# Patient Record
Sex: FEMALE | Race: WHITE | Employment: OTHER | URBAN - METROPOLITAN AREA
[De-identification: names, ages, dates, MRNs, and addresses within clinical notes are randomized per-mention and may not be internally consistent; named-entity substitution may affect disease eponyms.]

---

## 2017-10-11 ENCOUNTER — PRE VISIT (OUTPATIENT)
Dept: SURGERY | Facility: CLINIC | Age: 57
End: 2017-10-11

## 2017-10-11 NOTE — TELEPHONE ENCOUNTER
APPT INFORMATION    Date & Time:  10/18/17 at 10:30AM   Reason for Appt:  NBS   Referring Name/Clinic:  Letitia Vu [facility]    Yes / No COMMENT / NOTES DATE & ACTION   Patient Contacted?  No  Pt is referred                    RECORDS CLINIC NAME  (use N/A if no records ) DATE & ACTION RECEIVED RECS & IMG? Y/N   (may include other helpful notes)   External Clinics:  [facility] ([place]) 10/11/17 Faxed records request  yes               Internal Clinics:  n/a

## 2017-10-16 ENCOUNTER — CARE COORDINATION (OUTPATIENT)
Dept: SURGERY | Facility: CLINIC | Age: 57
End: 2017-10-16

## 2017-10-16 NOTE — PROGRESS NOTES
Called patient and left her number to call in regards to going over new patient questions. Also will send my chart message.

## 2017-10-16 NOTE — TELEPHONE ENCOUNTER
Records Received From:  [facility]     Date/Exam/Location  (specify location if different)   Office Notes:  4/12/17, 4/4/17, 3/15/17   Radiology Reports:  bone density 4/18/17

## 2017-10-16 NOTE — TELEPHONE ENCOUNTER
Records Received From:  [facility]     Date/Exam/Location  (specify location if different)   Office Notes:  9/29/17, 2/13/17, 2/16/17   Labs:  2017

## 2017-10-18 ENCOUNTER — ALLIED HEALTH/NURSE VISIT (OUTPATIENT)
Dept: SURGERY | Facility: CLINIC | Age: 57
End: 2017-10-18

## 2017-10-18 ENCOUNTER — OFFICE VISIT (OUTPATIENT)
Dept: SURGERY | Facility: CLINIC | Age: 57
End: 2017-10-18

## 2017-10-18 VITALS
DIASTOLIC BLOOD PRESSURE: 75 MMHG | WEIGHT: 255.4 LBS | SYSTOLIC BLOOD PRESSURE: 127 MMHG | OXYGEN SATURATION: 92 % | TEMPERATURE: 97.8 F | BODY MASS INDEX: 43.6 KG/M2 | HEART RATE: 74 BPM | HEIGHT: 64 IN

## 2017-10-18 DIAGNOSIS — E66.01 MORBID OBESITY (H): Primary | ICD-10-CM

## 2017-10-18 DIAGNOSIS — E66.01 MORBID OBESITY (H): ICD-10-CM

## 2017-10-18 LAB
ALBUMIN SERPL-MCNC: 3.8 G/DL (ref 3.4–5)
ALP SERPL-CCNC: 88 U/L (ref 40–150)
ALT SERPL W P-5'-P-CCNC: 28 U/L (ref 0–50)
ANION GAP SERPL CALCULATED.3IONS-SCNC: 10 MMOL/L (ref 3–14)
AST SERPL W P-5'-P-CCNC: 22 U/L (ref 0–45)
BILIRUB SERPL-MCNC: 0.3 MG/DL (ref 0.2–1.3)
BUN SERPL-MCNC: 20 MG/DL (ref 7–30)
CALCIUM SERPL-MCNC: 8.9 MG/DL (ref 8.5–10.1)
CHLORIDE SERPL-SCNC: 106 MMOL/L (ref 94–109)
CO2 SERPL-SCNC: 22 MMOL/L (ref 20–32)
CREAT SERPL-MCNC: 0.85 MG/DL (ref 0.52–1.04)
DEPRECATED CALCIDIOL+CALCIFEROL SERPL-MC: 57 UG/L (ref 20–75)
ERYTHROCYTE [DISTWIDTH] IN BLOOD BY AUTOMATED COUNT: 13.5 % (ref 10–15)
GFR SERPL CREATININE-BSD FRML MDRD: 69 ML/MIN/1.7M2
GLUCOSE SERPL-MCNC: 93 MG/DL (ref 70–99)
HBA1C MFR BLD: 6 % (ref 4.3–6)
HCT VFR BLD AUTO: 39.8 % (ref 35–47)
HGB BLD-MCNC: 12.9 G/DL (ref 11.7–15.7)
MCH RBC QN AUTO: 28.2 PG (ref 26.5–33)
MCHC RBC AUTO-ENTMCNC: 32.4 G/DL (ref 31.5–36.5)
MCV RBC AUTO: 87 FL (ref 78–100)
PLATELET # BLD AUTO: 300 10E9/L (ref 150–450)
POTASSIUM SERPL-SCNC: 4 MMOL/L (ref 3.4–5.3)
PROT SERPL-MCNC: 7.5 G/DL (ref 6.8–8.8)
PTH-INTACT SERPL-MCNC: 34 PG/ML (ref 12–72)
RBC # BLD AUTO: 4.57 10E12/L (ref 3.8–5.2)
SODIUM SERPL-SCNC: 138 MMOL/L (ref 133–144)
WBC # BLD AUTO: 7.3 10E9/L (ref 4–11)

## 2017-10-18 RX ORDER — TOPIRAMATE 25 MG/1
75 TABLET, FILM COATED ORAL 2 TIMES DAILY
Qty: 180 TABLET | Refills: 3 | Status: SHIPPED | OUTPATIENT
Start: 2017-10-18 | End: 2019-06-06

## 2017-10-18 ASSESSMENT — PAIN SCALES - GENERAL: PAINLEVEL: MODERATE PAIN (5)
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
## 2017-10-18 NOTE — MR AVS SNAPSHOT
"              After Visit Summary   10/18/2017    Dara Anderson    MRN: [id number]           Patient Information     Date Of Birth          1960        Visit Information        Provider Department      10/18/2017 10:30 AM Alyssa Pink PA-C [facility]        Today's Diagnoses     Morbid obesity (H)    -  1      Care Instructions    See dietitian in 1 month and monthly for 6 months  Labs today first floor    Bariatric Task List  Status:  Is patient a candidate for bariatric surgery?:    - possible   Status:  surgery evaluation in process -     Surgeon: Dr Puente -     Tentative surgery month/year: April 2018 -        Insurance: Insurance:  [employer] -        Patient Info: Initial Weight:  255.4 lbs -     Date of Initial Weight/Height:  10/18/2017 -     Goal Weight (lbs):  245 -     Required Weight Loss:  10 -     Surgery Type:  sleeve gastrectomy -        Dietician Visits: Structured weight loss required by insurance?:  Yes -     Number of Visits:  6 -     Dietician Visit 1:  Completed -     Dietician Visit 2:  Needed -     Dietician Visit 3:  Needed -     Dietician Visit 4:  Needed -     Dietician Visit 5:  Needed -     Dietician Visit 6:  Needed -        Psychological Evaluation: Psych eval:  Needed -        Lab Work: Complete Blood Count:  Needed -     Comprehensive Metabolic Panel:  Needed -     Vitamin D:  Needed -     Hgb A1c:  Needed -     PTH:  Needed -        Consults/ Clearance: Sleep Medicine:  Needed - LUCIANO, uses CPAP, need letter of clearance   Cardiac:  Needed -     Pain: Needed - letter from PCP addressing pain      PCP: Establish care with PCP:  Completed -     PCP letter of support:  Needed -        Patient Education:  Information Session:  Needed -     Given \"Making your decision\" handout?:  Yes -     Given support group information?:  Yes -     Support plan in place?:  Completed -     Research consents signed?:  Yes -        Final Tasks:  Before surgery " online class:  Needed -     Before surgery online class website link:  [email or web address]   After surgery online class:  Needed -     After surgery online class website link:  [email or web address]   Nurse visit for weigh-in and information:  Needed -     Pre-assessment clinic visit with anesthesia team for H&P:  Needed -     Final labs (Hgb, plt, T&S, UA):  Needed -        Notes:   -                   Follow-ups after your visit        Future tests that were ordered for you today     Open Future Orders        Priority Expected Expires Ordered    Hemoglobin A1c Routine  1/16/2018 10/18/2017            Who to contact     Please call your clinic at [phone] to:    Ask questions about your health    Make or cancel appointments    Discuss your medicines    Learn about your test results    Speak to your doctor   If you have compliments or concerns about an experience at your clinic, or if you wish to file a complaint, please contact [facility] Patient Relations at [phone] or email us at [email or web address]         Additional Information About Your Visit        Visual[employer] Information     [employer] gives you secure access to your electronic health record. If you see a primary care provider, you can also send messages to your care team and make appointments. If you have questions, please call your primary care clinic.  If you do not have a primary care provider, please call [phone] and they will assist you.      [employer] is an electronic gateway that provides easy, online access to your medical records. With [employer], you can request a clinic appointment, read your test results, renew a prescription or communicate with your care team.     To access your existing account, please contact your [facility] or call [phone] for assistance.        Care EveryWhere ID     This is your Care EveryWhere ID. This could be  "used by other organizations to access your [place] [facility] medical records  [id number]        Your Vitals Were     Pulse Temperature Height Pulse Oximetry BMI (Body Mass Index)       74 97.8  F (36.6  C) 5' 3.98\" 92% 43.87 kg/m2        Blood Pressure from Last 3 Encounters:   10/18/17 127/75   01/17/14 118/68   07/03/13 96/68    Weight from Last 3 Encounters:   10/18/17 255 lb 6.4 oz   07/03/13 228 lb 4.8 oz   02/25/13 231 lb              We Performed the Following     CBC with platelets     Comprehensive metabolic panel     Parathyroid Hormone Intact     Vitamin D Deficiency        Primary Care Provider Office Phone # Fax #    Letitia PETERSEN Quincyzack  [phone]       [facility] [place] [address]  [place] [address]        Equal Access to Services     [facility] Hospital[facility]HELLY : Hadii aad ku hadasho Soomaali, waaxda luqadaha, qaybta kaalmada adeegyada, candie rodriguezin hayaan mick liang . So [facility] [phone].    ATENCIÓN: Si habla español, tiene a coronel disposición servicios gratuitos de asistencia lingüística. Llame al [phone].    We comply with applicable federal civil rights laws and [place] laws. We do not discriminate on the basis of race, color, national origin, age, disability, sex, sexual orientation, or gender identity.            Thank you!     Thank you for choosing [facility]  for your care. Our goal is always to provide you with excellent care. Hearing back from our patients is one way we can continue to improve our services. Please take a few minutes to complete the written survey that you may receive in the mail after your visit with us. Thank you!             Your Updated Medication List - Protect others around you: Learn how to safely use, store and throw away your medicines at www.disposemymeds.org.          This list is accurate as of: 10/18/17 11:07 AM.  Always use your most recent med list.                   Brand Name Dispense Instructions for use " Diagnosis    acetaminophen-codeine 300-30 MG ED starter pack    TYLENOL #3    60 tablet    Take 1 tablet by mouth every 4 hours as needed TAKE 1-2 TABLETS EVERY 4 HOURS IF NEEDED FOR PAIN    Follow-up examination, following other surgery       calcium & magnesium carbonates 311-232 MG Tabs           cyclobenzaprine 10 MG tablet    FLEXERIL    90 tablet    Take 1 tablet (10 mg) by mouth 3 times daily as needed for muscle spasms    Other symptoms referable to back       DULoxetine 60 MG EC capsule    CYMBALTA    180 capsule    Take 2 capsules by mouth daily.        FLUoxetine 40 MG capsule    PROzac    20 capsule    Take 1 capsule by mouth daily.    Depressive disorder, not elsewhere classified       ibuprofen 600 MG tablet    ADVIL/MOTRIN     Take 1 tablet by mouth every 8 hours as needed. FOR PAIN        MULTIVITAMIN PO      Take by mouth daily        nortriptyline 50 MG capsule    PAMELOR    90 capsule    Take 1 capsule by mouth At Bedtime.    Depressive disorder, not elsewhere classified       pravastatin 40 MG tablet    PRAVACHOL    90 tablet    Take 1 tablet (40 mg) by mouth daily    Other and unspecified hyperlipidemia       topiramate 50 MG tablet    TOPAMAX    180 tablet    Take 1 tablet (50 mg) by mouth 2 times daily    Depressive disorder, not elsewhere classified

## 2017-10-18 NOTE — PATIENT INSTRUCTIONS
"GOALS:  Relating To Eating:  Eat slowly (20-30 minutes per meal), chewing foods well (25 chews per bite/applesauce consistency)  9\" Plate method (1/2 plate non-starchy vegetables/fruit, 1/4 plate lean protein, 1/4 plate whole grain starch - no more than 1/2 cup carb/meal)  Record food intake prior to eating throughout the day in a notebook.     Relating to beverages:  Reduce caffeine/carbonation/calorie containing beverages (decrease diet Coke and increase water).  Separate fluids from meals by 30 minutes before, during, and after eating.     Relating to activity:  Increase activity as able (walk as able; pool; knee PT; request back PT if interested)    Relating to cravings:  Rid your immediate environment of the trigger foods.   "
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
## 2017-10-18 NOTE — PATIENT INSTRUCTIONS
"See dietitian in 1 month and monthly for 6 months  Labs today first floor  Watch bariatric seminar  Schedule with Dr Rosey Gould for bariatric psych eval today OR call [phone] to schedule with Dr Laine Guzmán.    Bariatric Task List  Status:  Is patient a candidate for bariatric surgery?:    - possible   Status:  surgery evaluation in process -     Surgeon: Dr Puente -     Tentative surgery month/year: April 2018 -        Insurance: Insurance:  [facility] -        Patient Info: Initial Weight:  255.4 lbs -     Date of Initial Weight/Height:  10/18/2017 -     Goal Weight (lbs):  245 -     Required Weight Loss:  10 -     Surgery Type:  sleeve gastrectomy -        Dietician Visits: Structured weight loss required by insurance?:  Yes -     Number of Visits:  6 -     Dietician Visit 1:  Completed -     Dietician Visit 2:  Needed -     Dietician Visit 3:  Needed -     Dietician Visit 4:  Needed -     Dietician Visit 5:  Needed -     Dietician Visit 6:  Needed -        Psychological Evaluation: Psych eval:  Needed -        Lab Work: Complete Blood Count:  Needed -     Comprehensive Metabolic Panel:  Needed -     Vitamin D:  Needed -     Hgb A1c:  Needed -     PTH:  Needed -        Consults/ Clearance: Sleep Medicine:  Needed - LUCIANO, uses CPAP, need letter of clearance   Cardiac:  Needed -     Pain: Needed - letter from PCP addressing pain      PCP: Establish care with PCP:  Completed -     PCP letter of support:  Needed -        Patient Education:  Information Session:  Needed -     Given \"Making your decision\" handout?:  Yes -     Given support group information?:  Yes -     Support plan in place?:  Completed -     Research consents signed?:  Yes -        Final Tasks:  Before surgery online class:  Needed -     Before surgery online class website link:  [email or web address]   After surgery online class:  Needed -     After surgery online class website link:  [email or web address]   Nurse " visit for weigh-in and information:  Needed -     Pre-assessment clinic visit with anesthesia team for H&P:  Needed -     Final labs (Hgb, plt, T&S, UA):  Needed -        Notes:   -

## 2017-10-18 NOTE — NURSING NOTE
"(   Chief Complaint   Patient presents with     Consult     NBS    )    ( Weight: 255 lb 6.4 oz )  ( Height: 5' 3.98\" )  ( BMI (Calculated): 43.96 )  ( Initial Weight: 255 lb 6.4 oz )  ( Cumulative weight loss (lbs): 0 )  (   )  (   )  ( Waist Circumference (cm): 132.5 cm )  (   )    ( BP: 127/75 )  (   )  ( Temp: 97.8  F (36.6  C) )  (   )  ( Pulse: 74 )  (   )  ( SpO2: 92 % )    (   Patient Active Problem List   Diagnosis     Degeneration of lumbar or lumbosacral intervertebral disc     Sprain of thoracic region     Myalgia and myositis     Nonspecific reaction to tuberculin skin test without active tuberculosis     Other symptoms referable to back     Hyperlipidemia     Premature menopause     Pseudogout     Adjustment disorder with mixed anxiety and depressed mood     Major depressive disorder, recurrent episode, moderate (H)     S/P total knee replacement     Advanced directives, counseling/discussion    )  (   Current Outpatient Prescriptions   Medication Sig Dispense Refill     pravastatin (PRAVACHOL) 40 MG tablet Take 1 tablet (40 mg) by mouth daily 90 tablet 3     topiramate (TOPAMAX) 50 MG tablet Take 1 tablet (50 mg) by mouth 2 times daily 180 tablet 0     acetaminophen-codeine (TYLENOL #3) 300-30 MG Take 1 tablet by mouth every 4 hours as needed TAKE 1-2 TABLETS EVERY 4 HOURS IF NEEDED FOR PAIN 60 tablet 0     Multiple Vitamins-Minerals (MULTIVITAMIN OR) Take by mouth daily       calcium & magnesium carbonates 311-232 MG TABS        cyclobenzaprine (FLEXERIL) 10 MG tablet Take 1 tablet (10 mg) by mouth 3 times daily as needed for muscle spasms 90 tablet 5     ibuprofen (ADVIL,MOTRIN) 600 MG tablet Take 1 tablet by mouth every 8 hours as needed. FOR PAIN       nortriptyline (PAMELOR) 50 MG capsule Take 1 capsule by mouth At Bedtime. 90 capsule 3     FLUoxetine (PROZAC) 40 MG capsule Take 1 capsule by mouth daily. 20 capsule 0     DULoxetine (CYMBALTA) 60 MG capsule Take 2 capsules by mouth daily. 180 " capsule 3    )  ( Diabetes Eval:    )    ( Pain Eval:  Moderate Pain (5) )    ( Wound Eval:       )    (   History   Smoking Status     Former Smoker     Quit date: 1/1/1980   Smokeless Tobacco     Never Used     Comment: Quit in the l980's-smoked about three years    )    ( Signed By:  Alma Kiran; October 18, 2017; 10:23 AM )

## 2017-10-18 NOTE — PROGRESS NOTES
"New Bariatric Nutrition Consultation Note    Reason For Visit: Nutrition Assessment    \"Isabella\" Dara Anderson is a 57 year-old presenting today for new bariatric nutrition consult.  Pt is interested in laparoscopic sleeve gastrectomy with Dr. Puente. This is pt's first of 6 required nutrition visits prior to surgery. Pt referred by Alyssa MCCAULEY) on 10/18/17.   Patient is accompanied by friend Anamaria.    She is interested in having weight loss surgery for the following reasons:  To improve overall health and wellbeing (back pain, hypercholesterolemia).     Support System Reviewed With Patient 10/16/2017   Who do you have in your support network that can be available to help you for the first 2 weeks after surgery? Yes   Who can you count on for support throughout your weight loss surgery journey? Yes       ANTHROPOMETRICS:    Height as of an earlier encounter on 10/18/17: 1.625 m (5' 3.98\").    Weight as of an earlier encounter on 10/18/17: 115.8 kg (255 lb 6.4 oz) with BMI of 43.87.    Required weight loss goal pre-op: -10 lbs from initial consult weight (goal weight 245.4 lbs or less before surgery)       10/16/2017   I have tried the following methods to lose weight Exercise, TOPS (Take Off Pounds Sensibly)        Weight Loss Questions Reviewed With Patient 10/16/2017   How long have you been overweight? Since puberty     SUPPLEMENT INFORMATION:  MVI daily, Ca, fish oil    NUTRITION HISTORY:  Recall Diet Questions Reviewed With Patient 10/16/2017   Describe what you typically consume for breakfast (typical or most recent): Cereal with a small amount of 1% or skim milk   Describe what you typically consume for lunch (typical or most recent): Frozen entrée (Lean Cuisine or Banquet)   Describe what you typically consume for supper (typical or most recent): Meat, potato , & vegetable   Describe what you typically consume as snacks (typical or most recent): Cereal (at night with bedtime meds)   How many ounces " of water, or other low calorie drinks, do you drink daily (8 oz=1 glass)? 48 oz   How many ounces of carbonated (pop, beer, sparkling water) drinks do you drinky daily (8 oz=1 glass)? 32 oz diet Coke   How often do you drink alcohol? Never   *Pt is willing to continue to avoid alcohol after surgery.    Eating Habits 10/16/2017   Do you have any dietary restrictions? No   Do you currently binge eat (eat a large amount of food in a short time)? No   Are you an emotional eater? Yes   Do you get up to eat after falling asleep? No   What foods do you crave? Chocolate       Dining Out History Reviewed With Patient 10/16/2017   How often do you dine out? A couple of times a week.   Where do you dine out? (select all that apply) fast food chains   What types of food do you order when you dine out? Wraps       Physical Activity Reviewed With Patient 10/16/2017   How often do you exercise? 1 to 2 times per week   What is the duration of your exercise (in minutes)? 10 Minutes   What types of exercise do you do? walking   What keeps you from being more active?  Pain   *Pt is hindered by her back pain. Pt is currently having PT for her artificial knee.    NUTRITION DIAGNOSIS:  Obesity r/t long history of self-monitoring deficit and excessive energy intake aeb BMI >30.    INTERVENTION:  Intervention Provided/Education Provided on post-op diet guidelines, vitamins/minerals essential post-operatively, GI anatomy of bariatric surgeries, ways to help prepare for post-op diet guidelines pre-operatively, portion/calorie-control, mindful eating, and exercise.  Provided pt with list of goals RD contact information.      Questions Reviewed With Patient 10/16/2017   How ready are you to make changes regarding your weight? Number 1 = Not ready at all to make changes up to 10 = very ready. 10   How confident are you that you can change? 1 = Not confident that you will be successful making changes up to 10 = very confident. 10       Patient  "Understanding: good  Expected Compliance: good    GOALS:  Relating To Eating:  Eat slowly (20-30 minutes per meal), chewing foods well (25 chews per bite/applesauce consistency)  9\" Plate method (1/2 plate non-starchy vegetables/fruit, 1/4 plate lean protein, 1/4 plate whole grain starch - no more than 1/2 cup carb/meal)  Record food intake prior to eating throughout the day in a notebook.     Relating to beverages:  Reduce caffeine/carbonation/calorie containing beverages (decrease diet Coke and increase water).  Separate fluids from meals by 30 minutes before, during, and after eating.     Relating to activity:  Increase activity as able (walk as able; pool; knee PT; request back PT if interested)    Relating to cravings:  Rid your immediate environment of the trigger foods.     Follow-Up: 1 month    Time spent with patient: 30 minutes.  Ashlie Abel MS, RDN, LDN, CLT  Pager: [phone]      "

## 2017-10-18 NOTE — MR AVS SNAPSHOT
"                  MRN:[id number]                      After Visit Summary   10/18/2017    Dara Anderson    MRN: [id number]           Visit Information        Provider Department      10/18/2017 11:00 AM Ashlie Abel RD [facility]        Care Instructions    GOALS:  Relating To Eating:  Eat slowly (20-30 minutes per meal), chewing foods well (25 chews per bite/applesauce consistency)  9\" Plate method (1/2 plate non-starchy vegetables/fruit, 1/4 plate lean protein, 1/4 plate whole grain starch - no more than 1/2 cup carb/meal)  Record food intake prior to eating throughout the day in a notebook.     Relating to beverages:  Reduce caffeine/carbonation/calorie containing beverages (decrease diet Coke and increase water).  Separate fluids from meals by 30 minutes before, during, and after eating.     Relating to activity:  Increase activity as able (walk as able; pool; knee PT; request back PT if interested)    Relating to cravings:  Rid your immediate environment of the trigger foods.          [employer] Information     [employer] gives you secure access to your electronic health record. If you see a primary care provider, you can also send messages to your care team and make appointments. If you have questions, please call your primary care clinic.  If you do not have a primary care provider, please call [phone] and they will assist you.      [employer] is an electronic gateway that provides easy, online access to your medical records. With [employer], you can request a clinic appointment, read your test results, renew a prescription or communicate with your care team.     To access your existing account, please contact your [facility] or call [phone] for assistance.        Care EveryWhere ID     This is your Care EveryWhere ID. This could be used by other organizations to access your [place] medical records  [id number]        Equal Access to " Services     [facility]: Florentino Curtis, waellisda luqadaha, qaybta kaallizet arellano, candie cook. [facility] [phone].    ATENCIÓN: Si habla español, tiene a coronel disposición servicios gratuitos de asistencia lingüística. Llame al [phone].    We comply with applicable federal civil rights laws and [place] laws. We do not discriminate on the basis of race, color, national origin, age, disability, sex, sexual orientation, or gender identity.

## 2017-10-18 NOTE — LETTER
October 24, 2017      TO: Dara Anderson  [address] [address]         Dear Ms. Dara Anderson,    We received and reviewed your test results done on 10/18/2017.  You may receive more than one letter if we receive the results on multiple days.  Please share all lab and test results with your primary care provider and keep a copy for your own records.        Your test results are normal.    If you have any questions, feel free contact us at the Call Center [phone].      Resulted Orders   Comprehensive metabolic panel   Result Value Ref Range    Sodium 138 133 - 144 mmol/L    Potassium 4.0 3.4 - 5.3 mmol/L    Chloride 106 94 - 109 mmol/L    Carbon Dioxide 22 20 - 32 mmol/L    Anion Gap 10 3 - 14 mmol/L    Glucose 93 70 - 99 mg/dL    Urea Nitrogen 20 7 - 30 mg/dL    Creatinine 0.85 0.52 - 1.04 mg/dL    GFR Estimate 69 >60 mL/min/1.7m2      Comment:      Non  GFR Calc    GFR Estimate If Black 84 >60 mL/min/1.7m2      Comment:       GFR Calc    Calcium 8.9 8.5 - 10.1 mg/dL    Bilirubin Total 0.3 0.2 - 1.3 mg/dL    Albumin 3.8 3.4 - 5.0 g/dL    Protein Total 7.5 6.8 - 8.8 g/dL    Alkaline Phosphatase 88 40 - 150 U/L    ALT 28 0 - 50 U/L    AST 22 0 - 45 U/L   CBC with platelets   Result Value Ref Range    WBC 7.3 4.0 - 11.0 10e9/L    RBC Count 4.57 3.8 - 5.2 10e12/L    Hemoglobin 12.9 11.7 - 15.7 g/dL    Hematocrit 39.8 35.0 - 47.0 %    MCV 87 78 - 100 fl    MCH 28.2 26.5 - 33.0 pg    MCHC 32.4 31.5 - 36.5 g/dL    RDW 13.5 10.0 - 15.0 %    Platelet Count 300 150 - 450 10e9/L   Vitamin D Deficiency   Result Value Ref Range    Vitamin D Deficiency screening 57 20 - 75 ug/L      Comment:      Season, race, dietary intake, and treatment affect the concentration of   25-hydroxy-Vitamin D. Values may decrease during winter months and increase   during summer months. Values 20-29 ug/L may indicate Vitamin D insufficiency   and values <20 ug/L may indicate Vitamin D  deficiency.  Vitamin D determination is routinely performed by an immunoassay specific for   25 hydroxyvitamin D3.  If an individual is on vitamin D2 (ergocalciferol)   supplementation, please specify 25 OH vitamin D2 and D3 level determination by   LCMSMS test VITD23.     Parathyroid Hormone Intact   Result Value Ref Range    Parathyroid Hormone Intact 34 12 - 72 pg/mL           Sincerely,      Alyssa Pink PA-C

## 2017-10-25 ENCOUNTER — MEDICAL CORRESPONDENCE (OUTPATIENT)
Dept: HEALTH INFORMATION MANAGEMENT | Facility: CLINIC | Age: 57
End: 2017-10-25

## 2017-10-27 ENCOUNTER — TELEPHONE (OUTPATIENT)
Dept: SURGERY | Facility: CLINIC | Age: 57
End: 2017-10-27

## 2017-10-27 NOTE — TELEPHONE ENCOUNTER
Pt received letter with lab results but A1C was not included she would like a letter sent out with these results so she has a paper copy. Pt states she is on MyChart but does not have a printer. Will route to Alyssa's nurse. Lorraine UGALDE LPN

## 2017-11-10 ENCOUNTER — CARE COORDINATION (OUTPATIENT)
Dept: SURGERY | Facility: CLINIC | Age: 57
End: 2017-11-10

## 2017-11-10 NOTE — PROGRESS NOTES
Tasklist updated. Update sent to client via [email or web address].    Bariatric Task List  Status:  Is patient a candidate for bariatric surgery?:    - possible   Cleared to schedule surgeon consult?:    -     Status:  surgery evaluation in process -     Surgeon: Dr Puente -     Tentative surgery month/year: April 2018 -        Insurance: Insurance:  [facility] -     C[employer]: PCP Recommendation and Medical Clearance:    -     HP Referral:    -     Other:    -        Patient Info: Initial Weight:  255.4 lbs -     Date of Initial Weight/Height:  10/18/2017 -     Goal Weight (lbs):  245 -     Required Weight Loss:  10 -     Surgery Type:  sleeve gastrectomy -     Multidisciplinary Meeting:    -        Dietician Visits: Structured weight loss required by insurance?:  Yes -     Dietician Visit 1:  Completed - 10/18/17 in [facility]. [facility]   Dietician Visit 2:  Needed - 11/18/17 appt.   Dietician Visit 3:  Needed - 12/15/17 appt.   Dietician Visit 4:  Needed - 1/19/18 appt.   Dietician Visit 5:  Needed - 2/16/18 appt.   Dietician Visit 6:  Needed - 3/14/18 appt. May be changed to same day as surgeon appt if all tasks are done.   Dietician Visit additional:    -     Clearance from dietician to see surgeon?:    -     Dietician Notes:    -        Psychological Evaluation: Psych eval:  Needed - 2/2/18 Dr Gould appt.   Therapist letter of support:    -     Psychiatrist letter of support:    -     Establish care with therapist:    -     Complete eating disorder evaluation:    -     Letter of clearance from therapist/eating disorder program:    -     Other:    -        Lab Work: Complete Blood Count:  Completed - 10/18/17 results in [facility]. [facility]   Comprehensive Metabolic Panel:  Completed - 10/18/17 results in Epic. [facility]   Vitamin D:  Completed - 10/18/17 results in Epic. [facility]   Hgb A1c:  Completed - 10/18/17 = 6.0.BKS   PTH:  Completed - 10/18/17 results in Epic. [facility]   H. pylori:    -     TSH:    -     Nicotine Testing:    -     Other:    -        Consults/  "Clearance: Sleep Medicine:  Needed - LUCIANO, uses CPAP, need letter of clearance   Cardiac:  Needed -     Pain: Needed - letter from PCP addressing pain   Dental:    -     Endocrine:    -     Gastroenterology:    -     Vascular Medicine:    -     Hematology:    -     Medical Weight Management:   -     Physical Therapy/Exercise:    -     Nephrology:    -     Neurology:    -     Pulmonology:    -     Rheumatology:    -     Other    -     Other    -     Other    -           Testing: UGI:    -     EGD:    -     Other:    -        PCP: Establish care with PCP:  Completed -     Follow up with PCP:    -     PCP letter of support:  Needed -        Smoking: Quit tobacco use (3 months smoke free)?:    -     Quit date:    -        Patient Education:  Information Session:  Needed - View at [email or web address] and take quiz. I will get an email once your quiz results are submitted. Thanks, WINNIE Anders   Given \"Making your decision\" handout?:  Yes -     Given support group information?:  Yes -     Attended support group?:    -     Support plan in place?:  Completed -     Research consents signed?:  Yes -        Additional Surgery Requirements: Review Coag plan:    -     HgA1c <8:    -     Inpatient pain consult:    -     Final nicotine screen:    -     Dental work complete:    -     Birth control plan:    -     Other Requirements:    -     Other Requirements:    -        Final Tasks:  Before surgery online class:  Needed -     Before surgery online class website link:  [email or web address]   After surgery online class:  Needed -     After surgery online class website link:  [email or web address]   Nurse visit for weigh-in and information:  Needed -     Pre-assessment clinic visit with anesthesia team for H&P:  Needed -     Final labs (Hgb, plt, T&S, UA):  Needed -        Notes:   -           "

## 2017-11-18 ENCOUNTER — ALLIED HEALTH/NURSE VISIT (OUTPATIENT)
Dept: SURGERY | Facility: CLINIC | Age: 57
End: 2017-11-18

## 2017-11-18 VITALS — WEIGHT: 257.5 LBS | BODY MASS INDEX: 44.23 KG/M2

## 2017-11-18 NOTE — PATIENT INSTRUCTIONS
"GOALS:  Relating To Eating:  -Eat slowly (20-30 minutes per meal), chewing foods well (25 chews per bite/applesauce consistency).  -9\" Plate method (1/2 plate non-starchy vegetables/fruit, 1/4 plate lean protein, 1/4 plate whole grain starch - no more than 1/2 cup carb/meal).  -Record food intake prior to eating throughout the day in a notebook. Bring notebook with to RD visits.     Relating to beverages:  -Reduce caffeine/carbonation/calorie containing beverages (decrease diet Coke and increase water).  -Separate fluids from meals by 30 minutes before, during, and after eating.     Relating to activity:  -Increase activity as able (increasing walking). Tracking walking in notebook.     Relating to cravings:  -Rid your immediate environment of the trigger foods.     Ilene Choudhary RD, LD  Voicemail: [phone]  Appointments: [phone]    "

## 2017-11-18 NOTE — PROGRESS NOTES
"Bariatric Nutrition Consultation Note    Reason For Visit: Nutrition Reassessment    \"Isabella\" Dara Anderson is a 57 year-old presenting today for a follow-up bariatric nutrition consult.  Pt is interested in laparoscopic sleeve gastrectomy with Dr. Puente in April 2018. This is pt's 2nd of 6 required nutrition visits prior to surgery. Pt referred by Alyssa MCCAULEY) on 10/18/17.   Patient is accompanied by friend Anamaria (at initial visit).    Coordination Note:  (11/18/17): Reviewed pre-surgery task list with pt. Pt's PCP, Dr. Alyssa Vu at [place] in [facility], is writing a letter of support for sleep, cardiology, and pain (pt reports Dr. Shah manages all of these conditions). Pt is scheduled with Rosey Gould 2/2/18 for psych eval.     She is interested in having weight loss surgery for the following reasons:  To improve overall health and wellbeing (back pain, hypercholesterolemia).     Support System Reviewed With Patient 10/16/2017   Who do you have in your support network that can be available to help you for the first 2 weeks after surgery? Yes   Who can you count on for support throughout your weight loss surgery journey? Yes       ANTHROPOMETRICS:    Height as of an earlier encounter on 10/18/17: 1.625 m (5' 3.98\").    Weight as of an earlier encounter on 10/18/17: 115.8 kg (255 lb 6.4 oz) with BMI of 43.87.  Current Weight: 257.5 lbs (+2.1 lbs since last month)    Required weight loss goal pre-op: -10 lbs from initial consult weight (goal weight 245.4 lbs or less before surgery)    * (11/18/17): Pt attributes weight gain due to stress and having to eat out while father is in the hospital due to multiple heart surgeries.     SUPPLEMENT INFORMATION:  MVI daily, Ca, fish oil    NUTRITION HISTORY:    Progress Towards Previous Goals:   Eat slowly (20-30 minutes per meal), chewing foods well (25 chews per bite/applesauce consistency) - Improving. Pt is taking 10 minutes to consume meals. Pt is chewing to " "applesauce consistency 75% of the time.   9\" Plate method (1/2 plate non-starchy vegetables/fruit, 1/4 plate lean protein, 1/4 plate whole grain starch - no more than 1/2 cup carb/meal) - Improving. Pt is splitting meal with a family member. Pt is eating 2 meals per day, and snacking fruit. Pt is dining out due to having to stay at the hospital with her father.   Record food intake prior to eating throughout the day in a notebook. - Improving. Pt started tracking intake in a notebook and is tracking about 80% of meals. Pt plans to bring note book next time.   Reduce caffeine/carbonation/calorie containing beverages (decrease diet Coke and increase water). - Continues. Pt is drinking 80oz of Water and 3 cans to 120oz (3-40oz cups) Diet Coke.  Separate fluids from meals by 30 minutes before, during, and after eating. - Improving. Pt is  fluids from meals 50% of the time.   Increase activity as able (walk as able; pool; knee PT; request back PT if interested) - Improving. Pt is increasing walking and avoiding using a wheelchair. Pt did not start with PT again.   Rid your immediate environment of the trigger foods. - Improving. Pt eliminated chocolate from diet. Pt is eating apples and bananas (2-3 per day).    Recall Diet Questions Reviewed With Patient 10/16/2017   Describe what you typically consume for breakfast (typical or most recent): Cereal with a small amount of 1% or skim milk   Describe what you typically consume for lunch (typical or most recent): Frozen entrée (Lean Cuisine or Banquet)   Describe what you typically consume for supper (typical or most recent): Meat, potato , & vegetable   Describe what you typically consume as snacks (typical or most recent): Cereal (at night with bedtime meds)   How many ounces of water, or other low calorie drinks, do you drink daily (8 oz=1 glass)? 48 oz   How many ounces of carbonated (pop, beer, sparkling water) drinks do you drinky daily (8 oz=1 glass)? 32 oz " "diet Coke   How often do you drink alcohol? Never   *Pt is willing to continue to avoid alcohol after surgery.    Eating Habits 10/16/2017   Do you have any dietary restrictions? No   Do you currently binge eat (eat a large amount of food in a short time)? No   Are you an emotional eater? Yes   Do you get up to eat after falling asleep? No   What foods do you crave? Chocolate     *Pt is hindered by her back pain. Pt is currently having PT for her artificial knee.    NUTRITION DIAGNOSIS:  Obesity r/t long history of self-monitoring deficit and excessive energy intake aeb BMI >30. - Continues.     INTERVENTION:  Intervention Provided/Education Provided: Reviewed goals. Praised pt for progress with goals. Discussed ways to decrease calories when dining outside of the home and reducing carbonated beverages (Diet Coke). Pt plans to track time spent walking daily. Gave encouragement and support. Provided pt with list of goals and RD contact information.      Questions Reviewed With Patient 10/16/2017   How ready are you to make changes regarding your weight? Number 1 = Not ready at all to make changes up to 10 = very ready. 10   How confident are you that you can change? 1 = Not confident that you will be successful making changes up to 10 = very confident. 10       Patient Understanding: good  Expected Compliance: good    GOALS:  Relating To Eating:  -Eat slowly (20-30 minutes per meal), chewing foods well (25 chews per bite/applesauce consistency).  -9\" Plate method (1/2 plate non-starchy vegetables/fruit, 1/4 plate lean protein, 1/4 plate whole grain starch - no more than 1/2 cup carb/meal).  -Record food intake prior to eating throughout the day in a notebook. Bring notebook with to RD visits.     Relating to beverages:  -Reduce caffeine/carbonation/calorie containing beverages (decrease diet Coke and increase water).  -Separate fluids from meals by 30 minutes before, during, and after eating.     Relating to " activity:  -Increase activity as able (increasing walking). Tracking walking in notebook.     Relating to cravings:  -Rid your immediate environment of the trigger foods.     Follow-Up: 1 month    Time spent with patient: 30 minutes    Abby Choudhary RD, LD  Pager: [phone]

## 2017-11-18 NOTE — MR AVS SNAPSHOT
MRN:[id number]                      After Visit Summary   11/18/2017    Dara Anderson    MRN: [id number]           Visit Information        Provider Department      11/18/2017 10:30 AM Abby Choudhary RD [facility]        Your next 10 appointments already scheduled     Dec 15, 2017 10:30 AM CST   (Arrive by 10:15 AM)   NUTRITION VISIT with Ashlie Abel RD   [facility] ([facility])    [address] [place] [id number]   [phone]            Jan 19, 2018 10:00 AM CST   (Arrive by 9:45 AM)   NUTRITION VISIT with Ashlie Abel RD   [facility] ([facility])    [address] [place] [address]   [phone]            Feb 02, 2018 11:00 AM CST   (Arrive by 10:45 AM)   Bariatric Surgery Evaluation Testing with Rosey Gould, PhD   [facility] ([facility])    [address] [place] [address]   [phone]            Feb 02, 2018 12:00 PM CST   (Arrive by 11:45 AM)   Bariatric Surgery Evaluation Interview with Rosey Gould, PhD   [facility] ([facility])    [address] [place] [id number]   [phone]            Feb 16, 2018 10:00 AM CST   (Arrive by 9:45 AM)   NUTRITION VISIT with Ashlie Abel RD   [facility] ([facility])    [address] [place] [id number]   [phone]            Mar 14, 2018 10:00 AM CDT   (Arrive by 9:45 AM)   NUTRITION VISIT with Ashlie Abel RD   [facility] ([facility])    [address] [place] [id number]   [phone]              Care  "Instructions    GOALS:  Relating To Eating:  -Eat slowly (20-30 minutes per meal), chewing foods well (25 chews per bite/applesauce consistency).  -9\" Plate method (1/2 plate non-starchy vegetables/fruit, 1/4 plate lean protein, 1/4 plate whole grain starch - no more than 1/2 cup carb/meal).  -Record food intake prior to eating throughout the day in a notebook. Bring notebook with to RD visits.     Relating to beverages:  -Reduce caffeine/carbonation/calorie containing beverages (decrease diet Coke and increase water).  -Separate fluids from meals by 30 minutes before, during, and after eating.     Relating to activity:  -Increase activity as able (increasing walking). Tracking walking in notebook.     Relating to cravings:  -Rid your immediate environment of the trigger foods.     Ilene Choudhary, URBANO, LD  Voicemail: [phone]  Appointments: [phone]           [employer] Information     [employer] gives you secure access to your electronic health record. If you see a primary care provider, you can also send messages to your care team and make appointments. If you have questions, please call your primary care clinic.  If you do not have a primary care provider, please call [phone] and they will assist you.      [employer] is an electronic gateway that provides easy, online access to your medical records. With [employer], you can request a clinic appointment, read your test results, renew a prescription or communicate with your care team.     To access your existing account, please contact your [facility] or call [phone] for assistance.        Care EveryWhere ID     This is your Care EveryWhere ID. This could be used by other organizations to access your [place] medical records  [id number]        Equal Access to Services     GUILLAUME BOSCH AH: Hadelmer Curtis, erica rajput, candie mckee. So [facility] " [phone].    ATENCIÓN: Si habla español, tiene a coronel disposición servicios gratuitos de asistencia lingüística. Llame al [phone].    We comply with applicable federal civil rights laws and [place] laws. We do not discriminate on the basis of race, color, national origin, age, disability, sex, sexual orientation, or gender identity.

## 2017-12-15 ENCOUNTER — ALLIED HEALTH/NURSE VISIT (OUTPATIENT)
Dept: SURGERY | Facility: CLINIC | Age: 57
End: 2017-12-15
Payer: COMMERCIAL

## 2017-12-15 NOTE — PROGRESS NOTES
"Bariatric Nutrition Consultation Note    Reason For Visit: Nutrition Reassessment    \"Isabella\" Dara Anderson is a 57 year-old presenting today for a follow-up bariatric nutrition consult.  Pt is interested in laparoscopic sleeve gastrectomy with Dr. Puente in April 2018. This is pt's third of 6 required nutrition visits prior to surgery. Pt referred by Alyssa MCCAULEY) on 10/18/17.     Coordination Note:  (11/18/17): Reviewed pre-surgery task list with pt. Pt's PCP, Dr. Alyssa Vu at [facility] in [facility], is writing a letter of support for sleep, cardiology, and pain (pt reports Dr. Shah manages all of these conditions). Pt is scheduled with Rosey Gould 2/2/18 for psych eval.   (12/15/17): updated task list    She is interested in having weight loss surgery for the following reasons:  To improve overall health and wellbeing (back pain, hypercholesterolemia).     ANTHROPOMETRICS:    Height as of an earlier encounter on 10/18/17: 1.625 m (5' 3.98\").    Weight as of an earlier encounter on 10/18/17: 115.8 kg (255 lb 6.4 oz) with BMI of 43.87.  Current Weight: 251.1 lbs  (-6.4 lbs since last month; -4.3 lbs from initial weight)    Required weight loss goal pre-op: -10 lbs from initial consult weight (goal weight 245.4 lbs or less before surgery)    SUPPLEMENT INFORMATION:  MVI daily, Ca, fish oil    NUTRITION HISTORY:  Progress Towards Previous Goals:   Relating To Eating:  -Eat slowly (20-30 minutes per meal), chewing foods well (25 chews per bite/applesauce consistency). - Meeting.   -9\" Plate method (1/2 plate non-starchy vegetables/fruit, 1/4 plate lean protein, 1/4 plate whole grain starch - no more than 1/2 cup carb/meal).- Pt has not been able to pay as much attention to this due to her father's illness.   -Record food intake prior to eating throughout the day in a notebook. Bring notebook with to RD visits.  - Pt has been preoccupied with her father's illness (heart surgery and complications at [facility]) and therefore, did not record.     Relating to beverages:  -Reduce caffeine/carbonation/calorie containing beverages (decrease diet Coke and increase water). - Pt has mainly been drinking water and less diet Coke.  She has 0-2 cans of diet Coke/day, which is a great improvement per Pt report.   -Separate fluids from meals by 30 minutes before, during, and after eating.  - Continues to work on this.     Relating to activity:  -Increase activity as able (increasing walking). Tracking walking in notebook. - Increasing; 12,000 steps some days of wheeling her dad all over the [facility] in [place].  Pt lost her pedometer in the hospital somewhere.     Relating to cravings:  -Rid your immediate environment of the trigger foods. - Meeting (no chocolates in the house) with the exception of once when her mother made a pan of brownies.  She froze the brownies and had 2 bites and stopped.     *Pt is going to a weight loss support group in [place] headed by someone who had a [facility] GB.     Eating Habits 10/16/2017   Do you have any dietary restrictions? No   Do you currently binge eat (eat a large amount of food in a short time)? No   Are you an emotional eater? Yes   Do you get up to eat after falling asleep? No   What foods do you crave? Chocolate     *Pt is hindered by her back pain. Pt is currently having PT for her artificial knee.    NUTRITION DIAGNOSIS:  Obesity r/t long history of self-monitoring deficit and excessive energy intake aeb BMI >30. - Continues.     INTERVENTION:  Intervention Provided/Education Provided: Praised Pt on excellent progress with previous goals and weight loss recently.  Discussed benefits of tracking intake and steps to help stay on-track through challenging times. Reviewed ways to help prepare for surgery.  Gave encouragement and support. Provided pt with list of goals and RD contact information.      Patient Understanding: good  Expected Compliance: good    GOALS:  Relating To  "Eating:  -Eat slowly (20-30 minutes per meal), chewing foods well (25 chews per bite/applesauce consistency).  -9\" Plate method (1/2 plate non-starchy vegetables/fruit, 1/4 plate lean protein, 1/4 plate whole grain starch - no more than 1/2 cup carb/meal).  -Record food intake prior to eating throughout the day in a notebook. Bring notebook with to RD visits.     Relating to beverages:  -Reduce caffeine/carbonation/calorie containing beverages (decrease diet Coke and increase water).  -Separate fluids from meals by 30 minutes before, during, and after eating.     Relating to activity:  -Increase activity as able (increasing walking). Tracking walking in notebook.     Relating to cravings:  -Rid your immediate environment of the trigger foods.     Follow-Up: 1 month    Time spent with patient: 30 minutes    Ashlie Abel MS, RDN, LDN, CLT  Pager: [phone]          "

## 2017-12-15 NOTE — MR AVS SNAPSHOT
"                  MRN:[id number]                      After Visit Summary   12/15/2017    Dara Anderson    MRN: [id number]           Visit Information        Provider Department      12/15/2017 10:30 AM Ashlie Abel RD [facility] Surgical Weight Management        Your next 10 appointments already scheduled     Jan 19, 2018 10:00 AM CST   (Arrive by 9:45 AM)   NUTRITION VISIT with Ashlie Abel RD   [facility] Surgical [facility] Management ([facility])    [address] [place] [address]   [phone]            Feb 02, 2018 11:00 AM CST   (Arrive by 10:45 AM)   Bariatric Surgery Evaluation Testing with Rosey Gould, PhD   [facility] ([facility])    [address] [place] [address]   [phone]            Feb 02, 2018 12:00 PM CST   (Arrive by 11:45 AM)   Bariatric Surgery Evaluation Interview with Rosey Gould, PhD   [facility] ([facility])    [address] [place] [address]   [phone]            Feb 16, 2018 10:00 AM CST   (Arrive by 9:45 AM)   NUTRITION VISIT with Ashlie Abel RD   [facility] ([facility])    [address] [place] [address]   [phone]            Mar 14, 2018 10:00 AM CDT   (Arrive by 9:45 AM)   NUTRITION VISIT with Ashlie Abel RD   [facility] ([facility])    [address] [place] [address]   [phone]              Care Instructions    GOALS:  Relating To Eating:  -Eat slowly (20-30 minutes per meal), chewing foods well (25 chews per bite/applesauce consistency).  -9\" Plate method (1/2 plate non-starchy vegetables/fruit, 1/4 plate lean protein, 1/4 plate whole grain starch - no " more than 1/2 cup carb/meal).  -Record food intake prior to eating throughout the day in a notebook. Bring notebook with to RD visits.     Relating to beverages:  -Reduce caffeine/carbonation/calorie containing beverages (decrease diet Coke and increase water).  -Separate fluids from meals by 30 minutes before, during, and after eating.     Relating to activity:  -Increase activity as able (increasing walking). Tracking walking in notebook.     Relating to cravings:  -Rid your immediate environment of the trigger foods.          [employer] Information     [employer] gives you secure access to your electronic health record. If you see a primary care provider, you can also send messages to your care team and make appointments. If you have questions, please call your primary care clinic.  If you do not have a primary care provider, please call [phone] and they will assist you.      [employer] is an electronic gateway that provides easy, online access to your medical records. With [employer], you can request a clinic appointment, read your test results, renew a prescription or communicate with your care team.     To access your existing account, please contact your [facility] or call [phone] for assistance.        Care EveryWhere ID     This is your Care EveryWhere ID. This could be used by other organizations to access your [facility] medical records  [id number]        Equal Access to Services     GUILLAUME BOSCH : Florentino ramirezo Soelvaali, waaxda luqadaha, qaybta kaalmada adeegyada, candie cook. So [facility] [phone].    ATENCIÓN: Si habla español, tiene a coronel disposición servicios gratuitos de asistencia lingüística. Llame al [phone].    We comply with applicable federal civil rights laws and [place] laws. We do not discriminate on the basis of race, color, national origin, age, disability, sex, sexual orientation, or gender identity.

## 2017-12-15 NOTE — PATIENT INSTRUCTIONS
"GOALS:  Relating To Eating:  -Eat slowly (20-30 minutes per meal), chewing foods well (25 chews per bite/applesauce consistency).  -9\" Plate method (1/2 plate non-starchy vegetables/fruit, 1/4 plate lean protein, 1/4 plate whole grain starch - no more than 1/2 cup carb/meal).  -Record food intake prior to eating throughout the day in a notebook. Bring notebook with to RD visits.     Relating to beverages:  -Reduce caffeine/carbonation/calorie containing beverages (decrease diet Coke and increase water).  -Separate fluids from meals by 30 minutes before, during, and after eating.     Relating to activity:  -Increase activity as able (increasing walking). Tracking walking in notebook.     Relating to cravings:  -Rid your immediate environment of the trigger foods.     Bariatric Task List  Status:  Is patient a candidate for bariatric surgery?:    - possible   Cleared to schedule surgeon consult?:    -     Status:  surgery evaluation in process -     Surgeon: Dr Puente -     Tentative surgery month/year: April 2018 -        Insurance: Insurance:  BC -     Cign[employer]: PCP Recommendation and Medical Clearance:    -      Referral:    -     Other:    -        Patient Info: Initial Weight:  255.4 lbs -     Date of Initial Weight/Height:  10/18/2017 -     Goal Weight (lbs):  245 -     Required Weight Loss:  10 -     Surgery Type:  sleeve gastrectomy -     Multidisciplinary Meeting:    -        Dietician Visits: Structured weight loss required by insurance?:  Yes -     Dietician Visit 1:  Completed - 10/18/17 in [facility]. S   Dietician Visit 2:  Completed - 11/18/17: Complete- ND   Dietician Visit 3:  Completed - 12/15/17 appt. (beti)   Dietician Visit 4:  Needed - 1/19/18 appt.   Dietician Visit 5:  Needed - 2/16/18 appt.   Dietician Visit 6:  Needed - 3/14/18 appt. May be changed to same day as surgeon appt if all tasks are done.   Dietician Visit additional:    -     Clearance from dietician to see surgeon?:    -     Dietician " "Notes:    -        Psychological Evaluation: Psych eval:  Needed - 2/2/18 Dr Bryanna trujillo.   Therapist letter of support:    -     Psychiatrist letter of support:    -     Establish care with therapist:    -     Complete eating disorder evaluation:    -     Letter of clearance from therapist/eating disorder program:    -     Other:    -        Lab Work: Complete Blood Count:  Completed - 10/18/17 results in [facility]. B[facility]   Comprehensive Metabolic Panel:  Completed - 10/18/17 results in Epic. BKS   Vitamin D:  Completed - 10/18/17 results in Epic. BKS   Hgb A1c:  Completed - 10/18/17 = 6.0.BKS   PTH:  Completed - 10/18/17 results in Epic. BKS   H. pylori:    -     TSH:    -     Nicotine Testing:    -     Other:    -        Consults/ Clearance: Sleep Medicine:  Needed - LUCIANO, uses CPAP, need letter of clearance; PCP plans to write a letter for sleep per Pt ([facility])   Cardiac:  Needed -     Pain: Needed - letter from PCP addressing pain   Dental:    -     Endocrine:    -     Gastroenterology:    -     Vascular Medicine:    -     Hematology:    -     Medical Weight Management:   -     Physical Therapy/Exercise:    -     Nephrology:    -     Neurology:    -     Pulmonology:    -     Rheumatology:    -     Other    -     Other    -     Other    -           Testing: UGI:    -     EGD:    -     Other:    -        PCP: Establish care with PCP:  Completed -     Follow up with PCP:    -     PCP letter of support:  Needed - PCP writing letter from [facility] ([facility])      Smoking: Quit tobacco use (3 months smoke free)?:    -     Quit date:    -        Patient Education:  Information Session:  Needed - View at [email or web address] and take quiz. I will get an email once your quiz results are submitted. Thanks, WINNIE Anders   Given \"Making your decision\" handout?:  Yes -     Given support group information?:  Yes -     Attended support group?:    -     Support plan in place?:  Completed -     Research consents signed?:  Yes -        Additional " Surgery Requirements: Review Coag plan:    -     HgA1c <8:    -     Inpatient pain consult:    -     Final nicotine screen:    -     Dental work complete:    -     Birth control plan:    -     Other Requirements:    -     Other Requirements:    -        Final Tasks:  Before surgery online class:  Needed -     Before surgery online class website link:  [email or web address]   After surgery online class:  Needed -     After surgery online class website link:  [email or web address]   Nurse visit for weigh-in and information:  Needed -     Pre-assessment clinic visit with anesthesia team for H&P:  Needed -     Final labs (Hgb, plt, T&S, UA):  Needed -        Notes: (11/18/17): Reviewed pre-surgery task list with pt. Pt's PCP, Dr. Alyssa Vu at [place] in [facility], is writing a letter of support for sleep, cardiology, and pain (pt reports Dr. Shah manages all of these conditions). Pt is scheduled with Rosey cyr 2/2/18 for psych eval. - ND -

## 2018-01-19 ENCOUNTER — ALLIED HEALTH/NURSE VISIT (OUTPATIENT)
Dept: SURGERY | Facility: CLINIC | Age: 58
End: 2018-01-19
Payer: COMMERCIAL

## 2018-01-19 VITALS — WEIGHT: 234.38 LBS | BODY MASS INDEX: 40.26 KG/M2

## 2018-01-19 NOTE — PROGRESS NOTES
"Bariatric Nutrition Consultation Note    Reason For Visit: Nutrition Reassessment    \"Isabella\" Dara Anderson is a 57 year-old presenting today for a follow-up bariatric nutrition consult.  Pt is interested in laparoscopic sleeve gastrectomy with Dr. Puente in April 2018. This is pt's fourth of 6 required nutrition visits prior to surgery. Pt referred by Alyssa MCCAULEY) on 10/18/17.     Coordination Note:  (11/18/17): Reviewed pre-surgery task list with pt. Pt's PCP, Dr. Alyssa Vu at [place] in [facility], is writing a letter of support for sleep, cardiology, and pain (pt reports Dr. Shah manages all of these conditions). Pt is scheduled with Rosey Gould 2/2/18 for psych eval.   (12/15/17): Updated task list  (1/19/18): Pt planning to talk to PCP on Monday (Jan, 22nd) in regards to letter of support for sleep, cardiology and pain.    She is interested in having weight loss surgery for the following reasons:  To improve overall health and wellbeing (back pain, hypercholesterolemia).     ANTHROPOMETRICS:    Height as of an earlier encounter on 10/18/17: 1.625 m (5' 3.98\").    Weight as of an earlier encounter on 10/18/17: 115.8 kg (255 lb 6.4 oz) with BMI of 43.87.  Current Weight: 251.1 lbs  (-6.4 lbs since last month; -4.3 lbs from initial weight)    Required weight loss goal pre-op: -10 lbs from initial consult weight (goal weight 245.4 lbs or less before surgery)    SUPPLEMENT INFORMATION:  MVI daily, Ca, fish oil    NUTRITION HISTORY:  Progress Towards Previous Goals:   Relating To Eating:  -Eat slowly (20-30 minutes per meal), chewing foods well (25 chews per bite/applesauce consistency). - Met and continues     -9\" Plate method (1/2 plate non-starchy vegetables/fruit, 1/4 plate lean protein, 1/4 plate whole grain starch - no more than 1/2 cup carb/meal). -  Not met consistently, continues. Pt reports has not been eating enough non-starchy vegetables as she has been eating at her dad's rehab/nursing home " "frequently.      -Record food intake prior to eating throughout the day in a notebook. Bring notebook with to RD visits. - Met and continues.     Relating to beverages:  -Reduce caffeine/carbonation/calorie containing beverages (decrease diet Coke and increase water). - Improving and continues. Pt has not had diet coke since 1/7. Drinking at least 64 oz of water/day.    -Separate fluids from meals by 30 minutes before, during, and after eating. - Met and continues.     Relating to activity:  -Increase activity as able (increasing walking). Tracking walking in notebook. - Met and continues. 12,000 steps per day.     Relating to cravings:  -Rid your immediate environment of the trigger foods. Met and continues.        *Pt is hindered by her back pain. Pt is currently having PT for her artificial knee.    NUTRITION DIAGNOSIS:  Obesity r/t long history of self-monitoring deficit and excessive energy intake aeb BMI >30. - Continues.     INTERVENTION:  Intervention Provided/Education Provided: Praised Pt on excellent progress with previous goals and weight loss recently.  Discussed benefits of tracking intake and steps to help stay on-track through challenging times. Reviewed ways to help prepare for surgery.  Gave encouragement and support. Provided pt with list of goals and RD contact information.      Patient Understanding: good  Expected Compliance: good    GOALS:  Relating To Eating:  -Eat slowly (20-30 minutes per meal), chewing foods well (25 chews per bite/applesauce consistency).  -9\" Plate method (1/2 plate non-starchy vegetables/fruit, 1/4 plate lean protein, 1/4 plate whole grain starch - no more than 1/2 cup carb/meal).  -Record food intake prior to eating throughout the day in a notebook. Bring notebook with to RD visits.     Relating to beverages:  -Reduce caffeine/carbonation/calorie containing beverages (decrease diet Coke and increase water).  -Separate fluids from meals by 30 minutes before, during, and " after eating.     Relating to activity:  -Increase activity as able (increasing walking). Tracking walking in notebook.     Relating to cravings:  -Rid your immediate environment of the trigger foods.     Follow-Up: 1 month    Time spent with patient: 15 minutes  Mamie Espinal RD, LD

## 2018-01-19 NOTE — PATIENT INSTRUCTIONS
"  GOALS:  Relating To Eating:  -Eat slowly (20-30 minutes per meal), chewing foods well (25 chews per bite/applesauce consistency).  -9\" Plate method (1/2 plate non-starchy vegetables/fruit, 1/4 plate lean protein, 1/4 plate whole grain starch - no more than 1/2 cup carb/meal).  -Record food intake prior to eating throughout the day in a notebook. Bring notebook with to RD visits.     Relating to beverages:  -Reduce caffeine/carbonation/calorie containing beverages (decrease diet Coke and increase water).  -Separate fluids from meals by 30 minutes before, during, and after eating.     Relating to activity:  -Increase activity as able (increasing walking). Tracking walking in notebook.     Relating to cravings:  -Rid your immediate environment of the trigger foods.     Mamie Espinal RD, LD   Voicemail: [phone]    "

## 2018-01-19 NOTE — MR AVS SNAPSHOT
"                  MRN:[id number]                      After Visit Summary   1/19/2018    Dara Anderson    MRN: [id number]           Visit Information        Provider Department      1/19/2018 10:00 AM Ashlie Abel RD [facility] Surgical Weight Management        Your next 10 appointments already scheduled     Feb 02, 2018 11:00 AM CST   (Arrive by 10:45 AM)   Bariatric Surgery Evaluation Testing with Rosey Gould, PhD   [facility] ([facility])    [address] [place] [address]   [phone]            Feb 02, 2018 12:00 PM CST   (Arrive by 11:45 AM)   Bariatric Surgery Evaluation Interview with Rosey Gould, PhD   [facility] ([facility])    [address] [place] [address]   [phone]            Feb 16, 2018 10:00 AM CST   (Arrive by 9:45 AM)   NUTRITION VISIT with Abby Choudhary RD   [facility] ([facility])    [address] [place] [address]   [phone]            Mar 14, 2018 10:00 AM CDT   (Arrive by 9:45 AM)   NUTRITION VISIT with Vicki Crawford RD   [facility] ([facility])    [address] [place] [address]   [phone]              Care Instructions      GOALS:  Relating To Eating:  -Eat slowly (20-30 minutes per meal), chewing foods well (25 chews per bite/applesauce consistency).  -9\" Plate method (1/2 plate non-starchy vegetables/fruit, 1/4 plate lean protein, 1/4 plate whole grain starch - no more than 1/2 cup carb/meal).  -Record food intake prior to eating throughout the day in a notebook. Bring notebook with to RD visits.     Relating to beverages:  -Reduce caffeine/carbonation/calorie containing beverages (decrease diet Coke and increase water).  -Separate " fluids from meals by 30 minutes before, during, and after eating.     Relating to activity:  -Increase activity as able (increasing walking). Tracking walking in notebook.     Relating to cravings:  -Rid your immediate environment of the trigger foods.     Mamie Espinal RD, LD   Voicemail: [phone]           [employer] Information     [employer] gives you secure access to your electronic health record. If you see a primary care provider, you can also send messages to your care team and make appointments. If you have questions, please call your primary care clinic.  If you do not have a primary care provider, please call [phone] and they will assist you.      [employer] is an electronic gateway that provides easy, online access to your medical records. With [employer], you can request a clinic appointment, read your test results, renew a prescription or communicate with your care team.     To access your existing account, please contact your [facility] or call [phone] for assistance.        Care EveryWhere ID     This is your Care EveryWhere ID. This could be used by other organizations to access your [facility] medical records  [id number]        Equal Access to Services     GUILLAUME BOSCH : Hadelmer Curtis, wamaría rajput, qaraffi arellano, candie cook. So [facility] [place] [phone].    ATENCIÓN: Si habla español, tiene a coronel disposición servicios gratuitos de asistencia lingüística. Kell al [phone].    We comply with applicable federal civil rights laws and [place] laws. We do not discriminate on the basis of race, color, national origin, age, disability, sex, sexual orientation, or gender identity.

## 2018-02-02 ENCOUNTER — OFFICE VISIT (OUTPATIENT)
Dept: GASTROENTEROLOGY | Facility: CLINIC | Age: 58
End: 2018-02-02
Payer: COMMERCIAL

## 2018-02-02 VITALS — BODY MASS INDEX: 41.72 KG/M2 | WEIGHT: 242.9 LBS

## 2018-02-02 DIAGNOSIS — F54 PSYCHOLOGICAL FACTORS AFFECTING MEDICAL CONDITION: Primary | ICD-10-CM

## 2018-02-02 DIAGNOSIS — F33.1 MAJOR DEPRESSIVE DISORDER, RECURRENT EPISODE, MODERATE (H): ICD-10-CM

## 2018-02-02 DIAGNOSIS — F41.9 ANXIETY: ICD-10-CM

## 2018-02-02 NOTE — LETTER
2/2/2018       RE: Dara Anderson  [address] [address]     Dear Colleague,    Thank you for referring your patient, Dara Anderson, to the [facility] IBD CLIN[facility] at [facility]. Please see a copy of my visit note below.    The patient completed the following battery of assessments during this pre-operative bariatric surgery psychological evaluation: World Health Organization Disability Assessment Schedule 2.0 12-item (WHODAS), Patient Health Questionnaire-9 (PHQ-9), Generalized Anxiety Disorder-7 screener (DANTE-7), CAGE Questionnaire Adapted to Include Drugs (CAGE-AID), Suicide Behavior Questionnaire-Revised (SBQ-R), and the [place] Multiphasic Personality Inventory-2 (MMPI-2). Results will be included in the full report to follow.     Rosey Gould, PhD,   Clinical Health Psychologist    Again, thank you for allowing me to participate in the care of your patient.      Sincerely,    Rosey Gould, PhD

## 2018-02-02 NOTE — MR AVS SNAPSHOT
After Visit Summary   2/2/2018    Dara Anderson    MRN: [id number]           Patient Information     Date Of Birth          1960        Visit Information        Provider Department      2/2/2018 11:00 AM Rosey Gould, PhD [facility]        Today's Diagnoses     Psychological factors affecting medical condition    -  1       Follow-ups after your visit        Your next 10 appointments already scheduled     Feb 16, 2018 10:00 AM CST   (Arrive by 9:45 AM)   NUTRITION VISIT with URBANO Brown [facility] ([facility])    [address] [place] [address] [phone]            Mar 14, 2018 10:00 AM CDT   (Arrive by 9:45 AM)   NUTRITION VISIT with URBANO Brown [facility] ([facility])    [address] [place] [address] [phone]              Who to contact     Please call your clinic at [phone] to:    Ask questions about your health    Make or cancel appointments    Discuss your medicines    Learn about your test results    Speak to your doctor   If you have compliments or concerns about an experience at your clinic, or if you wish to file a complaint, please contact [facility] Patient Relations at [phone] or email us at [email or web address]         Additional Information About Your Visit        MyChart Information     [employer] gives you secure access to your electronic health record. If you see a primary care provider, you can also send messages to your care team and make appointments. If you have questions, please call your primary care clinic.  If you do not have a primary care provider, please call [phone] and they will assist you.      [employer] is an electronic gateway that provides easy, online access to your medical records. With  Myrna, you can request a clinic appointment, read your test results, renew a prescription or communicate with your care team.     To access your existing account, please contact your [facility] or call [phone] for assistance.        Care EveryWhere ID     This is your Care EveryWhere ID. This could be used by other organizations to access your [place] medical records  [id number]        Your Vitals Were     BMI (Body Mass Index)                   41.72 kg/m2            Blood Pressure from Last 3 Encounters:   10/18/17 127/75   01/17/14 118/68   07/03/13 96/68    Weight from Last 3 Encounters:   02/02/18 110.2 kg (242 lb 14.4 oz)   01/19/18 106.3 kg (234 lb 6 oz)   11/18/17 116.8 kg (257 lb 8 oz)              Today, you had the following     No orders found for display       Primary Care Provider Office Phone # Fax #    Letitia PETERSEN DO Horace [phone]       [facility] [address]  [place] [facility] [address]        Equal Access to Services     [facility]: Hadii aad ku hadasho Soomaali, waaxda luqadaha, qaybta kaalmada adeegyada, waxmerrill liang . So [facility] [phone].    ATENCIÓN: Si habla español, tiene a coronel disposición servicios gratuitos de asistencia lingüística. Kell al [phone].    We comply with applicable federal civil rights laws and [place] laws. We do not discriminate on the basis of race, color, national origin, age, disability, sex, sexual orientation, or gender identity.            Thank you!     Thank you for choosing [facility]  for your care. Our goal is always to provide you with excellent care. Hearing back from our patients is one way we can continue to improve our services. Please take a few minutes to complete the written survey that you may receive in the mail after your visit with us. Thank you!             Your Updated Medication List - Protect others around you: Learn  how to safely use, store and throw away your medicines at www.disposemymeds.org.          This list is accurate as of 2/2/18  4:15 PM.  Always use your most recent med list.                   Brand Name Dispense Instructions for use Diagnosis    acetaminophen-codeine 300-30 MG ED starter pack    TYLENOL #3    60 tablet    Take 1 tablet by mouth every 4 hours as needed TAKE 1-2 TABLETS EVERY 4 HOURS IF NEEDED FOR PAIN    Follow-up examination, following other surgery       calcium & magnesium carbonates 311-232 MG Tabs           cyclobenzaprine 10 MG tablet    FLEXERIL    90 tablet    Take 1 tablet (10 mg) by mouth 3 times daily as needed for muscle spasms    Other symptoms referable to back       DULoxetine 60 MG EC capsule    CYMBALTA    180 capsule    Take 2 capsules by mouth daily.        FLUoxetine 40 MG capsule    PROzac    20 capsule    Take 1 capsule by mouth daily.    Depressive disorder, not elsewhere classified       ibuprofen 600 MG tablet    ADVIL/MOTRIN     Take 1 tablet by mouth every 8 hours as needed. FOR PAIN        MULTIVITAMIN PO      Take by mouth daily        nortriptyline 50 MG capsule    PAMELOR    90 capsule    Take 1 capsule by mouth At Bedtime.    Depressive disorder, not elsewhere classified       pravastatin 40 MG tablet    PRAVACHOL    90 tablet    Take 1 tablet (40 mg) by mouth daily    Other and unspecified hyperlipidemia       topiramate 25 MG tablet    TOPAMAX    180 tablet    Take 3 tablets (75 mg) by mouth 2 times daily    Morbid obesity (H)

## 2018-02-02 NOTE — MR AVS SNAPSHOT
After Visit Summary   2/2/2018    Dara Anderson    MRN: [id number]           Patient Information     Date Of Birth          1960        Visit Information        Provider Department      2/2/2018 12:00 PM Rosey Gould, PhD [facility]        Today's Diagnoses     Psychological factors affecting medical condition    -  1    Major depressive disorder, recurrent episode, moderate (H)        Anxiety           Follow-ups after your visit        Your next 10 appointments already scheduled     Feb 16, 2018 10:00 AM CST   (Arrive by 9:45 AM)   NUTRITION VISIT with URBANO Brown [facility] ([facility])    [address] [place] [address] [phone]            Mar 14, 2018 10:00 AM CDT   (Arrive by 9:45 AM)   NUTRITION VISIT with URBANO Brown [facility] ([facility])    [address] [place] [address] [phone]              Who to contact     Please call your clinic at [phone] to:    Ask questions about your health    Make or cancel appointments    Discuss your medicines    Learn about your test results    Speak to your doctor   If you have compliments or concerns about an experience at your clinic, or if you wish to file a complaint, please contact [facility] Patient Relations at [phone] or email us at [email or web address]         Additional Information About Your Visit        [employer] Information     [employer] gives you secure access to your electronic health record. If you see a primary care provider, you can also send messages to your care team and make appointments. If you have questions, please call your primary care clinic.  If you do not have a primary care provider, please call [phone] and they will assist you.      Myrna is an  electronic gateway that provides easy, online access to your medical records. With [employer], you can request a clinic appointment, read your test results, renew a prescription or communicate with your care team.     To access your existing account, please contact your [facility] or call [phone] for assistance.        Care EveryWhere ID     This is your Care EveryWhere ID. This could be used by other organizations to access your [place] medical records  [id number]         Blood Pressure from Last 3 Encounters:   10/18/17 127/75   01/17/14 118/68   07/03/13 96/68    Weight from Last 3 Encounters:   02/02/18 110.2 kg (242 lb 14.4 oz)   01/19/18 106.3 kg (234 lb 6 oz)   11/18/17 116.8 kg (257 lb 8 oz)              Today, you had the following     No orders found for display       Primary Care Provider Office Phone # Fax #    Letitia PETERSEN DO Horace [phone]       [facility] [address] [address]  [place] [facility] [address]        Equal Access to Services     [facility]: Hadii aad ku hadasho Soomaali, waaxda luqadaha, qaybta kaalmada adeegyada, waxmerrill liang . So [facility] [phone].    ATENCIÓN: Si habla español, tiene a coronel disposición servicios gratuitos de asistencia lingüística. Llame al [phone].    We comply with applicable federal civil rights laws and [place] laws. We do not discriminate on the basis of race, color, national origin, age, disability, sex, sexual orientation, or gender identity.            Thank you!     Thank you for choosing [facility]  for your care. Our goal is always to provide you with excellent care. Hearing back from our patients is one way we can continue to improve our services. Please take a few minutes to complete the written survey that you may receive in the mail after your visit with us. Thank you!             Your Updated Medication List - Protect others around you: Learn  how to safely use, store and throw away your medicines at www.disposemymeds.org.          This list is accurate as of 2/2/18 11:59 PM.  Always use your most recent med list.                   Brand Name Dispense Instructions for use Diagnosis    acetaminophen-codeine 300-30 MG ED starter pack    TYLENOL #3    60 tablet    Take 1 tablet by mouth every 4 hours as needed TAKE 1-2 TABLETS EVERY 4 HOURS IF NEEDED FOR PAIN    Follow-up examination, following other surgery       calcium & magnesium carbonates 311-232 MG Tabs           cyclobenzaprine 10 MG tablet    FLEXERIL    90 tablet    Take 1 tablet (10 mg) by mouth 3 times daily as needed for muscle spasms    Other symptoms referable to back       DULoxetine 60 MG EC capsule    CYMBALTA    180 capsule    Take 2 capsules by mouth daily.        FLUoxetine 40 MG capsule    PROzac    20 capsule    Take 1 capsule by mouth daily.    Depressive disorder, not elsewhere classified       ibuprofen 600 MG tablet    ADVIL/MOTRIN     Take 1 tablet by mouth every 8 hours as needed. FOR PAIN        MULTIVITAMIN PO      Take by mouth daily        nortriptyline 50 MG capsule    PAMELOR    90 capsule    Take 1 capsule by mouth At Bedtime.    Depressive disorder, not elsewhere classified       pravastatin 40 MG tablet    PRAVACHOL    90 tablet    Take 1 tablet (40 mg) by mouth daily    Other and unspecified hyperlipidemia       topiramate 25 MG tablet    TOPAMAX    180 tablet    Take 3 tablets (75 mg) by mouth 2 times daily    Morbid obesity (H)
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
## 2018-02-06 ASSESSMENT — ANXIETY QUESTIONNAIRES
2. NOT BEING ABLE TO STOP OR CONTROL WORRYING: NEARLY EVERY DAY
5. BEING SO RESTLESS THAT IT IS HARD TO SIT STILL: NEARLY EVERY DAY
3. WORRYING TOO MUCH ABOUT DIFFERENT THINGS: NEARLY EVERY DAY
GAD7 TOTAL SCORE: 21
IF YOU CHECKED OFF ANY PROBLEMS ON THIS QUESTIONNAIRE, HOW DIFFICULT HAVE THESE PROBLEMS MADE IT FOR YOU TO DO YOUR WORK, TAKE CARE OF THINGS AT HOME, OR GET ALONG WITH OTHER PEOPLE: VERY DIFFICULT
4. TROUBLE RELAXING: NEARLY EVERY DAY
6. BECOMING EASILY ANNOYED OR IRRITABLE: NEARLY EVERY DAY
7. FEELING AFRAID AS IF SOMETHING AWFUL MIGHT HAPPEN: NEARLY EVERY DAY
1. FEELING NERVOUS, ANXIOUS, OR ON EDGE: NEARLY EVERY DAY

## 2018-02-07 ASSESSMENT — PATIENT HEALTH QUESTIONNAIRE - PHQ9: SUM OF ALL RESPONSES TO PHQ QUESTIONS 1-9: 16

## 2018-02-07 ASSESSMENT — ANXIETY QUESTIONNAIRES: GAD7 TOTAL SCORE: 21

## 2018-02-08 ENCOUNTER — TELEPHONE (OUTPATIENT)
Dept: PSYCHOLOGY | Facility: CLINIC | Age: 58
End: 2018-02-08
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
## 2018-02-16 ENCOUNTER — ALLIED HEALTH/NURSE VISIT (OUTPATIENT)
Dept: SURGERY | Facility: CLINIC | Age: 58
End: 2018-02-16
Payer: COMMERCIAL

## 2018-02-16 VITALS — WEIGHT: 238.13 LBS | BODY MASS INDEX: 40.9 KG/M2

## 2018-02-16 NOTE — MR AVS SNAPSHOT
MRN:[id number]                      After Visit Summary   2/16/2018    Dara Anderson    MRN: [id number]           Visit Information        Provider Department      2/16/2018 10:00 AM Abby Choudhary RD M [facility] Surgical Weight Management        Your next 10 appointments already scheduled     Mar 14, 2018 10:00 AM CDT   (Arrive by 9:45 AM)   NUTRITION VISIT with Vicki Crawford RD   [facility] ([facility])    [address] [place] [id number]   [phone]            Apr 02, 2018  2:00 PM CDT   (Arrive by 1:45 PM)   Return Visit with Rosey Gould, PhD   [facility] ([facility])    [address] [place] [id number] [phone]            May 01, 2018  2:00 PM CDT   (Arrive by 1:45 PM)   Return Visit with Rosey Gould, PhD   [facility] ([facility])    [address] [place] [id number] [phone]              [employer] Information     [employer] gives you secure access to your electronic health record. If you see a primary care provider, you can also send messages to your care team and make appointments. If you have questions, please call your primary care clinic.  If you do not have a primary care provider, please call [phone] and they will assist you.      [employer] is an electronic gateway that provides easy, online access to your medical records. With [employer], you can request a clinic appointment, read your test results, renew a prescription or communicate with your care team.     To access your existing account, please contact your [facility] or call [phone] for assistance.        Care EveryWhere ID     This is your Care EveryWhere ID. This could be used by other organizations to access your [facility]  records  [id number]        Equal Access to Services     GUILLAUME BOSCH : Florentino Curtis, erica rajput, ximena arellano, candie liang . So [facility] [phone].    ATENCIÓN: Si habla español, tiene a coronel disposición servicios gratuitos de asistencia lingüística. Llame al [phone].    We comply with applicable federal civil rights laws and [place] laws. We do not discriminate on the basis of race, color, national origin, age, disability, sex, sexual orientation, or gender identity.

## 2018-02-16 NOTE — PROGRESS NOTES
"Bariatric Nutrition Consultation Note    Reason For Visit: Nutrition Reassessment    \"Isabella\" Dara Anderson is a 57 year-old presenting today for a follow-up bariatric nutrition consult.  Pt is interested in laparoscopic sleeve gastrectomy with Dr. Puente in April 2018. This is pt's fifth of 6 required nutrition visits prior to surgery. Pt referred by Alyssa MCCAULEY) on 10/18/17.     Coordination Note:  (2/16/18): Pt completed psych eval with Rosey Gould. Has appointment with PCP end of February.      She is interested in having weight loss surgery for the following reasons:  To improve overall health and wellbeing (back pain, hypercholesterolemia).     ANTHROPOMETRICS:    Height as of an earlier encounter on 10/18/17: 1.625 m (5' 3.98\").    Weight as of an earlier encounter on 10/18/17: 115.8 kg (255 lb 6.4 oz) with BMI of 43.87.  Current Weight: 238.1 lbs  (-5.2 lbs since last month; -17.3 lbs from initial weight)    Required weight loss goal pre-op: -10 lbs from initial consult weight (goal weight 245.4 lbs or less before surgery) - Met     SUPPLEMENT INFORMATION:  MVI daily, Ca, fish oil    NUTRITION HISTORY:  *Pt is hindered by her back pain. Pt is currently having PT for her artificial knee.    Related to eating:  -Eat slowly (20-30 minutes per meal), chewing foods well (25 chews per bite/applesauce consistency). - Meeting.     -9\" Plate method (1/2 plate non-starchy vegetables/fruit, 1/4 plate lean protein, 1/4 plate whole grain starch - no more than 1/2 cup carb/meal). - Improving. Pt reports focusing more on protein and vegetable intake at lunch and dinner. States she's been snacking more lately r/t stress (reports her father's medical condition has declined and he is now home with hospice), may have a cookie or nuts in between meals, but doesn't binge on these items.     Relating to beverages:  -Reduce caffeine/carbonation/calorie containing beverages (decrease diet Coke and increase water). " "Meeting    -Separate fluids from meals by 30 minutes before, during, and after eating. Meeting      Relating to activity:  -Increase activity as able (increasing walking). Tracking walking in notebook. Meeting. Walking outside vs inside (treadmill vs shopping center) for 15-30 minutes majority of days.     Relating to cravings:  -Rid your immediate environment of the trigger foods. Improving.      NUTRITION DIAGNOSIS:  Obesity r/t long history of self-monitoring deficit and excessive energy intake aeb BMI >30. - Continues.     INTERVENTION:  Intervention Provided/Education Provided: Praised Pt on excellent progress with previous goals and continued weight loss. Reviewed organized meal patterns of 3 meals/day, avoiding snacking and instead practicing alternative activities to stress-eating. Otherwise, choosing non-starchy vegetables or fruit to snack on between meals if needed. Encouraged increasing physical activity as able. Gave encouragement and support. Provided pt with list of goals and RD contact information.      Patient Understanding: good  Expected Compliance: good    GOALS:  Relating To Eating:  -Eat slowly (20-30 minutes per meal), chewing foods well (25 chews per bite/applesauce consistency).  -9\" Plate method (1/2 plate non-starchy vegetables/fruit, 1/4 plate lean protein, 1/4 plate whole grain starch - no more than 1/2 cup carb/meal).  -Record food intake prior to eating throughout the day in a notebook. Bring notebook with to RD visits.     Relating to beverages:  -Reduce caffeine/carbonation/calorie containing beverages (decrease diet Coke and increase water).  -Separate fluids from meals by 30 minutes before, during, and after eating.     Relating to activity:  -Increase activity as able (increasing walking). Tracking walking in notebook.     Relating to cravings:  -Rid your immediate environment of the trigger foods.     Follow-Up: 1 month   Increase protein intake at breakfast     Time spent with " patient: 30 minutes  Mamie Espinal RD, LD

## 2018-03-14 ENCOUNTER — ALLIED HEALTH/NURSE VISIT (OUTPATIENT)
Dept: SURGERY | Facility: CLINIC | Age: 58
End: 2018-03-14
Payer: COMMERCIAL

## 2018-03-14 VITALS — BODY MASS INDEX: 40.42 KG/M2 | WEIGHT: 235.3 LBS

## 2018-03-14 NOTE — PATIENT INSTRUCTIONS
"GOALS:  Relating To Eating:  -Eat slowly (20-30 minutes per meal), chewing foods well (25 chews per bite/applesauce consistency).  -9\" Plate method (1/2 plate non-starchy vegetables/fruit, 1/4 plate lean protein, 1/4 plate whole grain starch - no more than 1/2 cup carb/meal).    Relating to beverages:  -Reduce caffeine/carbonation/calorie containing beverages (decrease diet Coke and increase water).  -Separate fluids from meals by 30 minutes before, during, and after eating.     Relating to activity:  -Increase activity as able (increasing walking). Tracking walking in notebook.     Relating to cravings:  -Rid your immediate environment of the trigger foods.     After surgery:  1. Follow the bariatric post-op diet advancement schedule:  - Bariatric clear liquid diet through post-op day 1 (while in the hospital).  - Bariatric low-fat full liquid diet on post-op days 2 through 13 (2 weeks).  2. Sip on 48-64 oz (or greater) fluids daily, recording intake to help stay on-track.  - Drink at least 2 oz of fluid every 30 min.  *Protein Shake Criteria: no more than 250 Calories, at least 20 grams of protein, and less than 10 grams of sugar     Meal Replacement Shake Options:   M Health Meal Replacement (250 Calories, 35 g protein)   Premier Protein (160 Calories, 30 g protein)  Slim Fast Advanced Nutrition (180 Calories, 20 g protein)  Muscle Milk, lactose-free, 17 oz bottle (210 Calories, 30 g protein)  Integrated Supplements, no artificial sugars (110 Calories, 20 g protein)    Vicki Crawford RD, LD  If you need to schedule or reschedule with a dietitian please call [phone].    "

## 2018-03-14 NOTE — PROGRESS NOTES
"Bariatric Nutrition Consultation Note    Reason For Visit: Nutrition Reassessment    \"Isabella\" Dara Anderson is a 57 year-old presenting today for a follow-up bariatric nutrition consult.  Pt is interested in laparoscopic sleeve gastrectomy with Dr. Puente in April 2018. This is pt's 6th of 6 required nutrition visits prior to surgery. Nutrition education regarding clear and low-fat full liquid diet for post-bariatric surgery (SG).  Pt referred by Alyssa MCCAULEY) on 10/18/17.     Coordination Note:  (3/14/18): Pt completed psych eval with Rosey Gould, follow up appointments scheduled.      She is interested in having weight loss surgery for the following reasons:  To improve overall health and wellbeing (back pain, hypercholesterolemia).     ANTHROPOMETRICS:    Height as of an earlier encounter on 10/18/17: 1.625 m (5' 3.98\").    Weight as of an earlier encounter on 10/18/17: 115.8 kg (255 lb 6.4 oz) with BMI of 43.87.  Current Weight: 235.3 lbs  (-2.8 lbs since last month; -20.1 lbs from initial weight)    Required weight loss goal pre-op: -10 lbs from initial consult weight (goal weight 245.4 lbs or less before surgery) - Met     SUPPLEMENT INFORMATION:  MVI daily, Ca, fish oil    NUTRITION HISTORY:  *Pt is hindered by her back pain. Pt is currently having PT for her artificial knee.    Progress with Previous Goals:  Relating To Eating:  -Eat slowly (20-30 minutes per meal), chewing foods well (25 chews per bite/applesauce consistency). Met  -9\" Plate method (1/2 plate non-starchy vegetables/fruit, 1/4 plate lean protein, 1/4 plate whole grain starch - no more than 1/2 cup carb/meal). Met   -Record food intake prior to eating throughout the day in a notebook. Bring notebook with to RD visits. Stopped recording    Relating to beverages:  -Reduce caffeine/carbonation/calorie containing beverages (decrease diet Coke and increase water). Stopped Jan 7th  -Separate fluids from meals by 30 minutes before, during, " "and after eating. met    Relating to activity:  -Increase activity as able (increasing walking). Tracking walking in notebook.  Met, everyday    Relating to cravings:  -Rid your immediate environment of the trigger foods. No chocolate in her house    NUTRITION DIAGNOSIS:  Obesity r/t long history of self-monitoring deficit and excessive energy intake aeb BMI >30. - Continues.   and  Food- and Nutrition-related knowledge deficit r/t lack of prior exposure to information AEB pt unable to verbalize main points of bariatric clear and low-fat full liquid diet.    INTERVENTION:  Intervention Provided/Education Provided: Praised Pt on excellent progress with previous goals and continued weight loss. Encouraged increasing physical activity as able.     Provided instruction on bariatric clear and low-fat full liquid diets.  Provided the following handouts: Diet Guidelines for Bariatric Surgery, Sources of Protein, Keeping Track of Your Fluids, list of recommended vitamin/mineral supplementation after SG surgery and RD contact information.  Patient Understanding: good  Expected Compliance: good    GOALS:  Relating To Eating:  -Eat slowly (20-30 minutes per meal), chewing foods well (25 chews per bite/applesauce consistency).  -9\" Plate method (1/2 plate non-starchy vegetables/fruit, 1/4 plate lean protein, 1/4 plate whole grain starch - no more than 1/2 cup carb/meal).    Relating to beverages:  -Reduce caffeine/carbonation/calorie containing beverages (decrease diet Coke and increase water).  -Separate fluids from meals by 30 minutes before, during, and after eating.     Relating to activity:  -Increase activity as able (increasing walking). Tracking walking in notebook.     Relating to cravings:  -Rid your immediate environment of the trigger foods.     After surgery:  1. Follow the bariatric post-op diet advancement schedule:  - Bariatric clear liquid diet through post-op day 1 (while in the hospital).  - Bariatric low-fat " full liquid diet on post-op days 2 through 13 (2 weeks).  2. Sip on 48-64 oz (or greater) fluids daily, recording intake to help stay on-track.  - Drink at least 2 oz of fluid every 30 min.  *Protein Shake Criteria: no more than 250 Calories, at least 20 grams of protein, and less than 10 grams of sugar     Meal Replacement Shake Options:   [employer] [facility] Meal Replacement (250 Calories, 35 g protein)   Premier Protein (160 Calories, 30 g protein)  Slim Fast Advanced Nutrition (180 Calories, 20 g protein)  Muscle Milk, lactose-free, 17 oz bottle (210 Calories, 30 g protein)  Integrated Supplements, no artificial sugars (110 Calories, 20 g protein)      Follow-Up: 1 month   Increase protein intake at breakfast     Time spent with patient: 30 minutes  Vicik Crawford RD, LD

## 2018-03-14 NOTE — MR AVS SNAPSHOT
"                  MRN:[id number]                      After Visit Summary   3/14/2018    Dara Anderson    MRN: [id number]           Visit Information        Provider Department      3/14/2018 10:00 AM Vicki Crawford RD [facility] Surgical Weight Management        Your next 10 appointments already scheduled     Apr 02, 2018  2:00 PM CDT   (Arrive by 1:45 PM)   Return Visit with Rosey Gould, PhD   [facility] ([facility])    [address] [place] [id number]   [phone]            May 01, 2018  2:00 PM CDT   (Arrive by 1:45 PM)   Return Visit with Rosey Gould, PhD   [facility] ([facility])    [address] [place] [phone]   [phone]              Care Instructions    GOALS:  Relating To Eating:  -Eat slowly (20-30 minutes per meal), chewing foods well (25 chews per bite/applesauce consistency).  -9\" Plate method (1/2 plate non-starchy vegetables/fruit, 1/4 plate lean protein, 1/4 plate whole grain starch - no more than 1/2 cup carb/meal).    Relating to beverages:  -Reduce caffeine/carbonation/calorie containing beverages (decrease diet Coke and increase water).  -Separate fluids from meals by 30 minutes before, during, and after eating.     Relating to activity:  -Increase activity as able (increasing walking). Tracking walking in notebook.     Relating to cravings:  -Rid your immediate environment of the trigger foods.     After surgery:  1. Follow the bariatric post-op diet advancement schedule:  - Bariatric clear liquid diet through post-op day 1 (while in the hospital).  - Bariatric low-fat full liquid diet on post-op days 2 through 13 (2 weeks).  2. Sip on 48-64 oz (or greater) fluids daily, recording intake to help stay on-track.  - Drink at least 2 oz of fluid every 30 min.  *Protein Shake Criteria: no more than 250 Calories, at " least 20 grams of protein, and less than 10 grams of sugar     Meal Replacement Shake Options:   M [facility] Meal Replacement (250 Calories, 35 g protein)   Premier Protein (160 Calories, 30 g protein)  Slim Fast Advanced Nutrition (180 Calories, 20 g protein)  Muscle Milk, lactose-free, 17 oz bottle (210 Calories, 30 g protein)  Integrated Supplements, no artificial sugars (110 Calories, 20 g protein)    Vicki Crawford RD, LD  If you need to schedule or reschedule with a dietitian please call [phone].           [employer] Information     [employer] gives you secure access to your electronic health record. If you see a primary care provider, you can also send messages to your care team and make appointments. If you have questions, please call your primary care clinic.  If you do not have a primary care provider, please call [phone] and they will assist you.      [employer] is an electronic gateway that provides easy, online access to your medical records. With [employer], you can request a clinic appointment, read your test results, renew a prescription or communicate with your care team.     To access your existing account, please contact your [facility] or call [phone] for assistance.        Care EveryWhere ID     This is your Care EveryWhere ID. This could be used by other organizations to access your [place] medical records  [id number]        Equal Access to Services     GUILLAUME BOSCH : Florentino Curtis, waaxda luqadaha, qaybta kaalmada adegeronimoyaleonardo, candie cook. So [facility] [phone].    ATENCIÓN: Si habla español, tiene a coronel disposición servicios gratuitos de asistencia lingüística. Llame al [phone].    We comply with applicable federal civil rights laws and [place] laws. We do not discriminate on the basis of race, color, national origin, age, disability, sex, sexual orientation, or gender identity.

## 2018-04-02 ENCOUNTER — OFFICE VISIT (OUTPATIENT)
Dept: GASTROENTEROLOGY | Facility: CLINIC | Age: 58
End: 2018-04-02
Payer: COMMERCIAL

## 2018-04-02 VITALS — WEIGHT: 236.4 LBS | BODY MASS INDEX: 40.61 KG/M2

## 2018-04-02 DIAGNOSIS — F33.0 MAJOR DEPRESSIVE DISORDER, RECURRENT EPISODE, MILD (H): Primary | ICD-10-CM

## 2018-04-02 DIAGNOSIS — F41.9 ANXIETY: ICD-10-CM

## 2018-04-02 ASSESSMENT — PATIENT HEALTH QUESTIONNAIRE - PHQ9
10. IF YOU CHECKED OFF ANY PROBLEMS, HOW DIFFICULT HAVE THESE PROBLEMS MADE IT FOR YOU TO DO YOUR WORK, TAKE CARE OF THINGS AT HOME, OR GET ALONG WITH OTHER PEOPLE: SOMEWHAT DIFFICULT
SUM OF ALL RESPONSES TO PHQ QUESTIONS 1-9: 9
SUM OF ALL RESPONSES TO PHQ QUESTIONS 1-9: 9

## 2018-04-02 NOTE — MR AVS SNAPSHOT
After Visit Summary   4/2/2018    Dara Anderson    MRN: [id number]           Patient Information     Date Of Birth          1960        Visit Information        Provider Department      4/2/2018 2:00 PM Rosey Gould, PhD [facility]        Today's Diagnoses     Major depressive disorder, recurrent episode, mild (H)    -  1    Anxiety           Follow-ups after your visit        Your next 10 appointments already scheduled     May 01, 2018  2:00 PM CDT   (Arrive by 1:45 PM)   Return Visit with PhD NICOLA Ag [facility] ([facility] [place])    [address]  [address] [facility] [address] [phone]              Who to contact     Please call your clinic at [phone] to:    Ask questions about your health    Make or cancel appointments    Discuss your medicines    Learn about your test results    Speak to your doctor            Additional Information About Your Visit        MyChart Information     [employer] gives you secure access to your electronic health record. If you see a primary care provider, you can also send messages to your care team and make appointments. If you have questions, please call your primary care clinic.  If you do not have a primary care provider, please call [phone] and they will assist you.      [employer] is an electronic gateway that provides easy, online access to your medical records. With [employer], you can request a clinic appointment, read your test results, renew a prescription or communicate with your care team.     To access your existing account, please contact your [facility] or call [phone] for assistance.        Care EveryWhere ID     This is your Care EveryWhere ID. This could be used by other organizations to access your [place] medical records  [id number]        Your Vitals Were     BMI (Body Mass Index)                    40.61 kg/m2            Blood Pressure from Last 3 Encounters:   10/18/17 127/75   01/17/14 118/68   07/03/13 96/68    Weight from Last 3 Encounters:   04/02/18 107.2 kg (236 lb 6.4 oz)   03/14/18 106.7 kg (235 lb 4.8 oz)   02/16/18 108 kg (238 lb 2 oz)              Today, you had the following     No orders found for display       Primary Care Provider Office Phone # Fax #    Letitia Vu [phone]       [facility] [address]  [place] [address]        Equal Access to Services     [facility]: Hadii aad ku hadasho Soomaali, waaxda luqadaha, qaybta kaalmada adeegyada, candie liang . So [facility] [phone].    ATENCIÓN: Si habla español, tiene a coronel disposición servicios gratuitos de asistencia lingüística. Llame al [phone].    We comply with applicable federal civil rights laws and [place] laws. We do not discriminate on the basis of race, color, national origin, age, disability, sex, sexual orientation, or gender identity.            Thank you!     Thank you for choosing [facility]  for your care. Our goal is always to provide you with excellent care. Hearing back from our patients is one way we can continue to improve our services. Please take a few minutes to complete the written survey that you may receive in the mail after your visit with us. Thank you!             Your Updated Medication List - Protect others around you: Learn how to safely use, store and throw away your medicines at www.disposemymeds.org.          This list is accurate as of 4/2/18  3:02 PM.  Always use your most recent med list.                   Brand Name Dispense Instructions for use Diagnosis    acetaminophen-codeine 300-30 MG ED starter pack    TYLENOL #3    60 tablet    Take 1 tablet by mouth every 4 hours as needed TAKE 1-2 TABLETS EVERY 4 HOURS IF NEEDED FOR PAIN    Follow-up examination, following other surgery        calcium & magnesium carbonates 311-232 MG Tabs           cyclobenzaprine 10 MG tablet    FLEXERIL    90 tablet    Take 1 tablet (10 mg) by mouth 3 times daily as needed for muscle spasms    Other symptoms referable to back       DULoxetine 60 MG EC capsule    CYMBALTA    180 capsule    Take 2 capsules by mouth daily.        FLUoxetine 40 MG capsule    PROzac    20 capsule    Take 1 capsule by mouth daily.    Depressive disorder, not elsewhere classified       ibuprofen 600 MG tablet    ADVIL/MOTRIN     Take 1 tablet by mouth every 8 hours as needed. FOR PAIN        MULTIVITAMIN PO      Take by mouth daily        nortriptyline 50 MG capsule    PAMELOR    90 capsule    Take 1 capsule by mouth At Bedtime.    Depressive disorder, not elsewhere classified       pravastatin 40 MG tablet    PRAVACHOL    90 tablet    Take 1 tablet (40 mg) by mouth daily    Other and unspecified hyperlipidemia       topiramate 25 MG tablet    TOPAMAX    180 tablet    Take 3 tablets (75 mg) by mouth 2 times daily    Morbid obesity (H)

## 2018-04-02 NOTE — PROGRESS NOTES
"  Health Psychology                  Clinic    Department of Medicine  Leanna Solis, PhD, LP [phone]                          Clinics and Surgery Center  [facility] Mikayla Jarrell, PhD, LP [phone]                  3rd Floor  [place] Mail Code [address]41   Adolph Mata, PhD, ABPP, LP [phone]     [address],   [address] [facility],  Rosey Gould,  PhD, LP [phone]            [place] [address] Yi Dietrich, PhD, LP [phone]    Health Psychology Follow-Up Note    SUBJECTIVE:  Dara Anderson (Terri) is a 58-year-old female considering laparoscopic sleeve gastrectomy with Dr. Puente.  Her initial consult weight was 255.4 pounds on 10/18/2017.  She presents with the following comorbidities associated with obesity:  Prediabetes, high cholesterol, sleep apnea, and weightbearing joint pain.  She has been asked to lose 10 pounds prior to weight loss surgery or to reach a weight of 245.4 pounds. Current weight today = 236.4#. She reported that she has discontinued use of diet coke,  liquids from meals by 30 minutes, attending [employer] 6 days per week (completes PT exercises for back, elliptical and treadmill), 3 liters of water per day, and attending weight loss surgery support group in [place], and completed 6/6 dietician visits.     Reviewed PHQ-9 questionnaire results of total score = 9 and response of 1 on item 9; she denied thoughts of suicide or self-harm, reported she meant that meant feeling \"in the way\" and \"not useful\" in her family following separation from her step sister's family. She noted that thoughts of suicide have not influenced herself after back surgery. She reported mood has been challenging over the last month due to her father's death. Has coped support of friend, exercise, and relationship with mother. She also reported that she stopped trying to focus on changing perspectives of siblings but has focused on " continuing positive relationships with nieces and nephews. She stated her father  on , which resulted in separation from her step-siblings. She stated that in the past she would have gained 10 lbs in such a stressor, but has continued to exercise and lose weight in order to care for herself physically and emotionally.     A treatment plan was completed in collaboration with the patient in this session. Goals are to improving coping with depression and anxiety. Provided a rationale for treatment and education about the course and duration of treatment.  The patient agreed with the recommendation to engage in cognitive behavioral therapy. Discussed  the relationship between thoughts, feelings, and emotions. Provided psychoeducation about cognitive restructuring, including how to identify cognitive distortions and automatic thoughts, challenge automatic thoughts, and create more realistic, adaptive alternative thoughts. Reviewed a list of cognitive distortions and encouraged the patient to discuss how each may be present in their life. The patient identified  black-and-white thinking, jumping to conclusions, disqualifying the positive, and should statements.  Encouraged consideration of evidence that supports and refutes automatic thoughts. Homework is to increase awareness of maladaptive automatic thoughts.     OBJECTIVE:  Appearance/Behavior/Orientation: Patient was on time, appropriately groomed and dressed, and demonstrated good eye contact. Alert and oriented to person, place, time, and situation. No evidence of psychomotor agitation.     Cooperation/Reliability: Patient was open and cooperative throughout the interview.    Speech/Language: Speech was clear, coherent, and of normal rate, rhythm and volume.    Thought Form: Overall logical and organized.   Thought Content: Appropriate to interview and situation (e.g., appropriately focused on health and weight).  Perception: Patient denied any difficulties  with a thought disorder, including auditory or visual hallucinations.   Cognition/Memory: Not formally assessed, but no difficulties apparent upon interview.   Attention/Concentration: Good throughout interview.    Fund of knowledge: Consistent with age and level of education.    Abstract reasoning: Not assessed.   Judgment: Intact.    Mood/Affect: Mood euthymic; appropriate range of affect.    Insight/Motivation: Good insight into influence of emotions and behavior on weight. Motivation for bariatric surgery appears high.  Suicide/Assault: Patient denies suicidal or assaultive ideation, plan, or intent    ASSESSMENT:  Isabella Anderson is a 58-year-old female considering laparoscopic sleeve gastrectomy.  Overall, she presents with a capacity to provide informed consent and appears to have a good understanding of the risks, potential complications and postoperative responsibilities.  She appears to have above average expectations for weight loss following sleeve gastrectomy, but upon counseling of average outcomes, she stated she would be satisfied if she achieved average results.  She appears to have social support through a weight loss surgery support group in [place], [place], which she has attended twice and plans to continue.  She also stated that her family is supportive of her choice for weight loss surgery, although relationships with her family appear tense at this time.  Here mental health history is significant for major depressive disorder, recurrent, and anxiety.  She is currently treated with psychotropic medications, primarily Cymbalta and Prozac, with these medications formerly managing symptoms adequately but with symptoms being exacerbated in recent periods of stress related to family conflict and her father's illness.  It appears that mental health symptoms are related to emotional eating and prevent her from being physically active.  Coping strategies appear to be primarily avoidant based and  she may benefit from participating in psychotherapy to learn more active and effective strategies to cope with stress.  There appears to be significant stressors related to family tension and her father's illness. She does not currently have a psychiatric provider outside of her primary care doctor.  She plans to initiate care next week with a counselor near her home in [place], [place].  She also has endorsed passive thoughts of suicide, present over the last 2 weeks, with no intent, plan, or access to lethal means.  She does not have a history of psychiatric hospitalization or suicide attempt.  She currently endorsed emotional eating behaviors due to stress but denied binge eating, compensatory behaviors following a binge or restrictive eating patterns.  There does not appear to be any self-reported problems with adherence nor is there any problems with adherence documented in the medical record related to following recommendations for weight loss medications or attending weight loss surgery clinic appointments.  She has been able to adhere to dietician recommendations well enough to surpass the 10-pound weight loss goal thus far.      DIAGNOSIS:  Major depressive disorder, recurrent, mild; anxiety, unspecified; bereavement; Psychological factors affecting medical conditions; morbid obesity    PLAN:  RTC every 2-4 weeks.     Time in: 2:08  Time out: 3:02  Extended session due to complexity of case and length of interval.    Rosey Gould, PhD,   Clinical Health Psychologist    Tx plan completed: 4/2/18  Tx plan due:  4/2/19      Answers for HPI/ROS submitted by the patient on 4/2/2018   If you checked off any problems, how difficult have these problems made it for you to do your work, take care of things at home, or get along with other people?: Somewhat difficult  PHQ9 TOTAL SCORE: 9

## 2018-04-02 NOTE — LETTER
"4/2/2018       RE: Dara Anderson  [address] [facility] [address]     Dear Colleague,    Thank you for referring your patient, Dara Anderson, to the [facility]STROENTEROLOG[facility] IBD [facility]INIC at [facility]. Please see a copy of my visit note below.      Health Psychology                  Clinic    Department of Medicine  Leanna Solis, PhD, LP [phone]                          Clinics and Surgery Center  [facility] Mikayla Jarrell, PhD, LP [phone]                  3rd Floor  [place] Mail Code 741   Adolph Mata, PhD, ABPP, LP [phone]     [address] [facility],   [address] [facility],  Rosey Gould,  PhD, LP [phone]            [place] [address]  [place] [address] Yi Dietrich, PhD, LP [phone]    Health Psychology Follow-Up Note    SUBJECTIVE:  Dara Anderson (Terri) is a 58-year-old female considering laparoscopic sleeve gastrectomy with Dr. Puente.  Her initial consult weight was 255.4 pounds on 10/18/2017.  She presents with the following comorbidities associated with obesity:  Prediabetes, high cholesterol, sleep apnea, and weightbearing joint pain.  She has been asked to lose 10 pounds prior to weight loss surgery or to reach a weight of 245.4 pounds. Current weight today = 236.4#. She reported that she has discontinued use of diet coke,  liquids from meals by 30 minutes, attending [employer] 6 days per week (completes PT exercises for back, elliptical and treadmill), 3 liters of water per day, and attending weight loss surgery support group in [place], and completed 6/6 dietician visits.     Reviewed PHQ-9 questionnaire results of total score = 9 and response of 1 on item 9; she denied thoughts of suicide or self-harm, reported she meant that meant feeling \"in the way\" and \"not useful\" in her family following separation from her step sister's family. She noted that thoughts of suicide have " not influenced herself after back surgery. She reported mood has been challenging over the last month due to her father's death. Has coped support of friend, exercise, and relationship with mother. She also reported that she stopped trying to focus on changing perspectives of siblings but has focused on continuing positive relationships with nieces and nephews. She stated her father  on , which resulted in separation from her step-siblings. She stated that in the past she would have gained 10 lbs in such a stressor, but has continued to exercise and lose weight in order to care for herself physically and emotionally.     A treatment plan was completed in collaboration with the patient in this session. Goals are to improving coping with depression and anxiety. Provided a rationale for treatment and education about the course and duration of treatment.  The patient agreed with the recommendation to engage in cognitive behavioral therapy. Discussed  the relationship between thoughts, feelings, and emotions. Provided psychoeducation about cognitive restructuring, including how to identify cognitive distortions and automatic thoughts, challenge automatic thoughts, and create more realistic, adaptive alternative thoughts. Reviewed a list of cognitive distortions and encouraged the patient to discuss how each may be present in their life. The patient identified  black-and-white thinking, jumping to conclusions, disqualifying the positive, and should statements.  Encouraged consideration of evidence that supports and refutes automatic thoughts. Homework is to increase awareness of maladaptive automatic thoughts.     OBJECTIVE:  Appearance/Behavior/Orientation: Patient was on time, appropriately groomed and dressed, and demonstrated good eye contact. Alert and oriented to person, place, time, and situation. No evidence of psychomotor agitation.     Cooperation/Reliability: Patient was open and cooperative throughout  the interview.    Speech/Language: Speech was clear, coherent, and of normal rate, rhythm and volume.    Thought Form: Overall logical and organized.   Thought Content: Appropriate to interview and situation (e.g., appropriately focused on health and weight).  Perception: Patient denied any difficulties with a thought disorder, including auditory or visual hallucinations.   Cognition/Memory: Not formally assessed, but no difficulties apparent upon interview.   Attention/Concentration: Good throughout interview.    Fund of knowledge: Consistent with age and level of education.    Abstract reasoning: Not assessed.   Judgment: Intact.    Mood/Affect: Mood euthymic; appropriate range of affect.    Insight/Motivation: Good insight into influence of emotions and behavior on weight. Motivation for bariatric surgery appears high.  Suicide/Assault: Patient denies suicidal or assaultive ideation, plan, or intent    ASSESSMENT:  Isabella Anderson is a 58-year-old female considering laparoscopic sleeve gastrectomy.  Overall, she presents with a capacity to provide informed consent and appears to have a good understanding of the risks, potential complications and postoperative responsibilities.  She appears to have above average expectations for weight loss following sleeve gastrectomy, but upon counseling of average outcomes, she stated she would be satisfied if she achieved average results.  She appears to have social support through a weight loss surgery support group in [place], [place], which she has attended twice and plans to continue.  She also stated that her family is supportive of her choice for weight loss surgery, although relationships with her family appear tense at this time.  Here mental health history is significant for major depressive disorder, recurrent, and anxiety.  She is currently treated with psychotropic medications, primarily Cymbalta and Prozac, with these medications formerly managing symptoms  adequately but with symptoms being exacerbated in recent periods of stress related to family conflict and her father's illness.  It appears that mental health symptoms are related to emotional eating and prevent her from being physically active.  Coping strategies appear to be primarily avoidant based and she may benefit from participating in psychotherapy to learn more active and effective strategies to cope with stress.  There appears to be significant stressors related to family tension and her father's illness. She does not currently have a psychiatric provider outside of her primary care doctor.  She plans to initiate care next week with a counselor near her home in [place], [place].  She also has endorsed passive thoughts of suicide, present over the last 2 weeks, with no intent, plan, or access to lethal means.  She does not have a history of psychiatric hospitalization or suicide attempt.  She currently endorsed emotional eating behaviors due to stress but denied binge eating, compensatory behaviors following a binge or restrictive eating patterns.  There does not appear to be any self-reported problems with adherence nor is there any problems with adherence documented in the medical record related to following recommendations for weight loss medications or attending weight loss surgery clinic appointments.  She has been able to adhere to dietician recommendations well enough to surpass the 10-pound weight loss goal thus far.      DIAGNOSIS:  Major depressive disorder, recurrent, mild; anxiety, unspecified; bereavement; Psychological factors affecting medical conditions; morbid obesity    PLAN:  RTC every 2-4 weeks.     Time in: 2:08  Time out: 3:02  Extended session due to complexity of case and length of interval.    Tx plan completed: 4/2/18  Tx plan due:  4/2/19      Again, thank you for allowing me to participate in the care of your patient.      Sincerely,    Rosey Gould, PhD

## 2018-04-03 ASSESSMENT — PATIENT HEALTH QUESTIONNAIRE - PHQ9: SUM OF ALL RESPONSES TO PHQ QUESTIONS 1-9: 9

## 2018-05-01 ENCOUNTER — OFFICE VISIT (OUTPATIENT)
Dept: GASTROENTEROLOGY | Facility: CLINIC | Age: 58
End: 2018-05-01
Payer: COMMERCIAL

## 2018-05-01 VITALS — BODY MASS INDEX: 39.03 KG/M2 | WEIGHT: 227.2 LBS

## 2018-05-01 DIAGNOSIS — F54 PSYCHOLOGICAL FACTORS AFFECTING MEDICAL CONDITION: ICD-10-CM

## 2018-05-01 DIAGNOSIS — F33.0 MAJOR DEPRESSIVE DISORDER, RECURRENT EPISODE, MILD (H): Primary | ICD-10-CM

## 2018-05-01 ASSESSMENT — PATIENT HEALTH QUESTIONNAIRE - PHQ9
SUM OF ALL RESPONSES TO PHQ QUESTIONS 1-9: 0
10. IF YOU CHECKED OFF ANY PROBLEMS, HOW DIFFICULT HAVE THESE PROBLEMS MADE IT FOR YOU TO DO YOUR WORK, TAKE CARE OF THINGS AT HOME, OR GET ALONG WITH OTHER PEOPLE: NOT DIFFICULT AT ALL
SUM OF ALL RESPONSES TO PHQ QUESTIONS 1-9: 0

## 2018-05-01 NOTE — MR AVS SNAPSHOT
After Visit Summary   5/1/2018    Dara Anderson    MRN: [id number]           Patient Information     Date Of Birth          1960        Visit Information        Provider Department      5/1/2018 2:00 PM Rosey Gould, PhD [facility]        Today's Diagnoses     Major depressive disorder, recurrent episode, mild (H)    -  1    Psychological factors affecting medical condition           Follow-ups after your visit        Who to contact     Please call your clinic at [phone] to:    Ask questions about your health    Make or cancel appointments    Discuss your medicines    Learn about your test results    Speak to your doctor            Additional Information About Your Visit        [facility] Information     [employer]) gives you secure access to your electronic health record. If you see a primary care provider, you can also send messages to your care team and make appointments. If you have questions, please call your primary care clinic.  If you do not have a primary care provider, please call [phone] and they will assist you.      [employer] ([employer]) is an electronic gateway that provides easy, online access to your medical records. With [employer]), you can request a clinic appointment, read your test results, renew a prescription or communicate with your care team.     To access your existing account, please contact your [facility] or call [phone] for assistance.        Care EveryWhere ID     This is your Care EveryWhere ID. This could be used by other organizations to access your [facility] medical records  [id number]        Your Vitals Were     BMI (Body Mass Index)                   39.03 kg/m2            Blood Pressure from Last 3 Encounters:   10/18/17 127/75   01/17/14 118/68   07/03/13 96/68    Weight from Last 3 Encounters:   05/01/18 103.1 kg (227 lb 3.2 oz)   04/02/18 107.2 kg (236 lb 6.4 oz)   03/14/18 106.7 kg (235 lb 4.8 oz)               Today, you had the following     No orders found for display       Primary Care Provider Office Phone # Fax #    Letitia Vu [phone]       [facility] [place] [address]  [place] [address]        Equal Access to Services     DOUG ZHOUSHELLY : Hadii aad ku hadasho Soomaali, waaxda luqadaha, qaybta kaalmada adeegyada, waxay idiin haycalinn adegeronimo mata lamartinagabe . So [facility] [phone].    ATENCIÓN: Si habla español, tiene a coronel disposición servicios gratuitos de asistencia lingüística. Llame al [phone].    We comply with applicable federal civil rights laws and [place] laws. We do not discriminate on the basis of race, color, national origin, age, disability, sex, sexual orientation, or gender identity.            Thank you!     Thank you for choosing [facility] IBD [facility]IN[facility]  for your care. Our goal is always to provide you with excellent care. Hearing back from our patients is one way we can continue to improve our services. Please take a few minutes to complete the written survey that you may receive in the mail after your visit with us. Thank you!             Your Updated Medication List - Protect others around you: Learn how to safely use, store and throw away your medicines at www.disposemymeds.org.          This list is accurate as of 5/1/18  3:07 PM.  Always use your most recent med list.                   Brand Name Dispense Instructions for use Diagnosis    acetaminophen-codeine 300-30 MG ED starter pack    TYLENOL #3    60 tablet    Take 1 tablet by mouth every 4 hours as needed TAKE 1-2 TABLETS EVERY 4 HOURS IF NEEDED FOR PAIN    Follow-up examination, following other surgery       calcium & magnesium carbonates 311-232 MG Tabs           cyclobenzaprine 10 MG tablet    FLEXERIL    90 tablet    Take 1 tablet (10 mg) by mouth 3 times daily as needed for muscle spasms    Other symptoms referable to back       DULoxetine 60 MG EC capsule     CYMBALTA    180 capsule    Take 2 capsules by mouth daily.        FLUoxetine 40 MG capsule    PROzac    20 capsule    Take 1 capsule by mouth daily.    Depressive disorder, not elsewhere classified       ibuprofen 600 MG tablet    ADVIL/MOTRIN     Take 1 tablet by mouth every 8 hours as needed. FOR PAIN        MULTIVITAMIN PO      Take by mouth daily        nortriptyline 50 MG capsule    PAMELOR    90 capsule    Take 1 capsule by mouth At Bedtime.    Depressive disorder, not elsewhere classified       pravastatin 40 MG tablet    PRAVACHOL    90 tablet    Take 1 tablet (40 mg) by mouth daily    Other and unspecified hyperlipidemia       topiramate 25 MG tablet    TOPAMAX    180 tablet    Take 3 tablets (75 mg) by mouth 2 times daily    Morbid obesity (H)

## 2018-05-01 NOTE — LETTER
5/1/2018       RE: aDra Anderson  [address] [facility] [address]     Dear Colleague,    Thank you for referring your patient, Draa Anderson, to the [facility] at [facility]. Please see a copy of my visit note below.      Health Psychology                  Clinic    Department of Medicine  Leanna Solis, PhD, LP [phone]                          Clinics and Surgery Center  [facility] Mikayla Jarrell, PhD, LP [phone]                  3rd Floor  [place] Mail Code 741   Adolph Mata, PhD, ABPP, LP [phone]     [address] [facility],   [address] [facility],  Rosey Gould,  PhD, LP [phone]            [place] [address]  [place] [address] Yi Dietrich, PhD, LP [phone]    Health Psychology Follow-Up Note    SUBJECTIVE:  Dara Anderson (Terri) is a 58-year-old female considering laparoscopic sleeve gastrectomy with Dr. Puente.  Her initial consult weight was 255.4 pounds on 10/18/2017.  She presents with the following comorbidities associated with obesity:  Prediabetes, high cholesterol, sleep apnea, and weightbearing joint pain.  She has been asked to lose 10 pounds prior to weight loss surgery or to reach a weight of 245.4 pounds. Current weight today = 227.2#.    Over the last month, focusing on health and nutrition, stating that she is trying to put family stress aside, not letting that stress impact her health. She reported continuing to attend the weight loss surgery support group in [place], and has met with individually with the leader of the support group for extra connection with other, continuing to attend [employer] 6 days per week (completes PT exercises for back, elliptical and treadmill), discontinuing consumption of diet coke,  liquids from meals by 30 minutes, and completed 6/6 dietician visits.     Reviewed PHQ-9 questionnaire results = total score of 0  today, with no thoughts of suicide. Main coping strategy was reported to be letting go of family's opinion of her and focusing on her self-care and health. She also reported support from her mother and friends. She also reported utilization of cognitive reframing over last month, with reviewing handout in stressful situation in order to change perspective. She reported that grief is well managed.     Session agenda focused on discussing additional stress management strategies, including relaxation training (deep breathing, imagery, muscle relaxation), cognitive interventions (changing perspective), and behavioral strategies (using reinforcements to motivate behavior changes, activity pacing, and SMART goals).      OBJECTIVE:  Appearance/Behavior/Orientation: Patient was on time, appropriately groomed and dressed, and demonstrated good eye contact. Alert and oriented to person, place, time, and situation. No evidence of psychomotor agitation.     Cooperation/Reliability: Patient was open and cooperative throughout the interview.    Speech/Language: Speech was clear, coherent, and of normal rate, rhythm and volume.    Thought Form: Overall logical and organized.   Thought Content: Appropriate to interview and situation (e.g., appropriately focused on health and weight).  Perception: Patient denied any difficulties with a thought disorder, including auditory or visual hallucinations.   Cognition/Memory: Not formally assessed, but no difficulties apparent upon interview.   Attention/Concentration: Good throughout interview.    Fund of knowledge: Consistent with age and level of education.    Abstract reasoning: Not assessed.   Judgment: Intact.    Mood/Affect: Mood euthymic; appropriate range of affect.    Insight/Motivation: Good insight into influence of emotions and behavior on weight. Motivation for bariatric surgery appears high.  Suicide/Assault: Patient denies suicidal or assaultive ideation, plan, or  intent    ASSESSMENT:  Isabella Anderson is a 58-year-old female considering laparoscopic sleeve gastrectomy.  Overall, she presents with a capacity to provide informed consent and appears to have a good understanding of the risks, potential complications and postoperative responsibilities.  She appears to have above average expectations for weight loss following sleeve gastrectomy, but upon counseling of average outcomes, she stated she would be satisfied if she achieved average results.  She  reported strong social support through a weight loss surgery support group in [place], [place], her mother, and several friends. She also stated that her mother is supportive of her choice for weight loss surgery. Family stress appears to be well managed and has not impaired her ability to implement/maintain health behavior change. Her mental health history is significant for major depressive disorder, recurrent, and anxiety.  She is currently treated with psychotropic medications, primarily Cymbalta and Prozac, with symptoms currently well managed, decreasing in severity since February 2018. She has denied thoughts of suicide. Denied current problems with emotional eating or cravings. Coping strategies appear to be active and include exercise, support from friends, and attending a support group. There does not appear to be any self-reported problems with adherence nor is there any problems with adherence documented in the medical record related to following recommendations for weight loss medications or attending weight loss surgery clinic appointments.  She has been able to adhere to dietician recommendations well enough to surpass the 10-pound weight loss goal thus far.      DIAGNOSIS:  Major depressive disorder, recurrent, mild; anxiety, unspecified; bereavement; Psychological factors affecting medical conditions; morbid obesity    RECOMMENDATION/PLAN:  Ms. Anderson has met all requirements from health psychology and  appears to be an appropriate candidate at this time. She is approved from a psychological perspective.     Provided psychoeducation about role of health psych services post-op and encouraged her to follow-up as needed for management of weight, stress, or mental health symptoms.     Time in: 2:08  Time out: 3:02  Extended session due to complexity of case and length of interval.    Rosey Gould, PhD,   Clinical Health Psychologist    Tx plan completed: 4/2/18  Tx plan due:  4/2/19

## 2018-05-01 NOTE — PROGRESS NOTES
Health Psychology                  Clinic    Department of Medicine  Leanna Solis, PhD, LP [phone]                          Clinics and Surgery Center  [facility] Mikayla Jarrell, PhD, LP [phone]                  3rd Floor  [place] Mail Code 741   Adolph Mata, PhD, ABPP, LP [phone]     [address] [facility],   [address] [facility],  Rosey Gould,  PhD, LP [phone]            [place] [address] Yi Dietrich, PhD, LP [phone]    Health Psychology Follow-Up Note    SUBJECTIVE:  Dara Anderson (Terri) is a 58-year-old female considering laparoscopic sleeve gastrectomy with Dr. Puente.  Her initial consult weight was 255.4 pounds on 10/18/2017.  She presents with the following comorbidities associated with obesity:  Prediabetes, high cholesterol, sleep apnea, and weightbearing joint pain.  She has been asked to lose 10 pounds prior to weight loss surgery or to reach a weight of 245.4 pounds. Current weight today = 227.2#.    Over the last month, focusing on health and nutrition, stating that she is trying to put family stress aside, not letting that stress impact her health. She reported continuing to attend the weight loss surgery support group in [place], and has met with individually with the leader of the support group for extra connection with other, continuing to attend [employer] 6 days per week (completes PT exercises for back, elliptical and treadmill), discontinuing consumption of diet coke,  liquids from meals by 30 minutes, and completed 6/6 dietician visits.     Reviewed PHQ-9 questionnaire results = total score of 0 today, with no thoughts of suicide. Main coping strategy was reported to be letting go of family's opinion of her and focusing on her self-care and health. She also reported support from her mother and friends. She also reported utilization of cognitive reframing over last month, with reviewing handout in  stressful situation in order to change perspective. She reported that grief is well managed.     Session agenda focused on discussing additional stress management strategies, including relaxation training (deep breathing, imagery, muscle relaxation), cognitive interventions (changing perspective), and behavioral strategies (using reinforcements to motivate behavior changes, activity pacing, and SMART goals).      OBJECTIVE:  Appearance/Behavior/Orientation: Patient was on time, appropriately groomed and dressed, and demonstrated good eye contact. Alert and oriented to person, place, time, and situation. No evidence of psychomotor agitation.     Cooperation/Reliability: Patient was open and cooperative throughout the interview.    Speech/Language: Speech was clear, coherent, and of normal rate, rhythm and volume.    Thought Form: Overall logical and organized.   Thought Content: Appropriate to interview and situation (e.g., appropriately focused on health and weight).  Perception: Patient denied any difficulties with a thought disorder, including auditory or visual hallucinations.   Cognition/Memory: Not formally assessed, but no difficulties apparent upon interview.   Attention/Concentration: Good throughout interview.    Fund of knowledge: Consistent with age and level of education.    Abstract reasoning: Not assessed.   Judgment: Intact.    Mood/Affect: Mood euthymic; appropriate range of affect.    Insight/Motivation: Good insight into influence of emotions and behavior on weight. Motivation for bariatric surgery appears high.  Suicide/Assault: Patient denies suicidal or assaultive ideation, plan, or intent    ASSESSMENT:  Isabella Anderson is a 58-year-old female considering laparoscopic sleeve gastrectomy.  Overall, she presents with a capacity to provide informed consent and appears to have a good understanding of the risks, potential complications and postoperative responsibilities.  She appears to have above  average expectations for weight loss following sleeve gastrectomy, but upon counseling of average outcomes, she stated she would be satisfied if she achieved average results.  She reported strong social support through a weight loss surgery support group in [place], [place], her mother, and several friends. She also stated that her mother is supportive of her choice for weight loss surgery. Family stress appears to be well managed and has not impaired her ability to implement/maintain health behavior change. Her mental health history is significant for major depressive disorder, recurrent, and anxiety.  She is currently treated with psychotropic medications, primarily Cymbalta and Prozac, with symptoms currently well managed, decreasing in severity since February 2018. She has denied thoughts of suicide. Denied current problems with emotional eating or cravings. Coping strategies appear to be active and include exercise, support from friends, and attending a support group. There does not appear to be any self-reported problems with adherence nor is there any problems with adherence documented in the medical record related to following recommendations for weight loss medications or attending weight loss surgery clinic appointments.  She has been able to adhere to dietician recommendations well enough to surpass the 10-pound weight loss goal thus far.      DIAGNOSIS:  Major depressive disorder, recurrent, mild; anxiety, unspecified; bereavement; Psychological factors affecting medical conditions; morbid obesity    RECOMMENDATION/PLAN:  Ms. Anderson has met all requirements from health psychology and appears to be an appropriate candidate at this time. She is approved from a psychological perspective.     Provided psychoeducation about role of health psych services post-op and encouraged her to follow-up as needed for management of weight, stress, or mental health symptoms.     Time in: 2:08  Time out:  3:02  Extended session due to complexity of case and length of interval.    Rosey Gould, PhD,   Clinical Health Psychologist    Tx plan completed: 4/2/18  Tx plan due:  4/2/19  Answers for HPI/ROS submitted by the patient on 5/1/2018   If you checked off any problems, how difficult have these problems made it for you to do your work, take care of things at home, or get along with other people?: Not difficult at all  PHQ9 TOTAL SCORE: 0

## 2018-05-02 ENCOUNTER — CARE COORDINATION (OUTPATIENT)
Dept: SURGERY | Facility: CLINIC | Age: 58
End: 2018-05-02

## 2018-05-02 ASSESSMENT — PATIENT HEALTH QUESTIONNAIRE - PHQ9: SUM OF ALL RESPONSES TO PHQ QUESTIONS 1-9: 0

## 2018-05-02 NOTE — PROGRESS NOTES
Tasklist updated and copy sent to client via [employer].    Bariatric Task List  Status:  Is patient a candidate for bariatric surgery?:    - possible   Cleared to schedule surgeon consult?:    -     Status:  surgery evaluation in process -     Surgeon: Dr Puente -     Tentative surgery month/year: To be determined when tasks are done. June 2018? -        Insurance: Insurance:  [employer] -        Patient Info: Initial Weight:  255.4 lbs -     Date of Initial Weight/Height:  10/18/2017 -     Goal Weight (lbs):  245 -     Required Weight Loss:  10 -     Surgery Type:  sleeve gastrectomy -        Dietician Visits: Structured weight loss required by insurance?:  Yes -     Dietician Visit 1:  Completed - 10/18/17 in [facility]. [facility]   Dietician Visit 2:  Completed - 11/18/17: Complete- [place]   Dietician Visit 3:  Completed - 12/15/17 appt. ([facility])   Dietician Visit 4:  Completed - 1/19/18 ()   Dietician Visit 5:  Completed - 2/16/18 ()    Dietician Visit 6:  Completed - 3/14/18 appt.      Psychological Evaluation: Psych eval:  Completed - 2/2/18 Dr Gould appt.5/1/18 Cleared.   Psychiatrist letter of support:    -Letitia Vu DO (PCP) gave med adjustments to do before surgery        Lab Work: Complete Blood Count:  Completed - 10/18/17 results in [facility]. S   Comprehensive Metabolic Panel:  Completed - 10/18/17 results in Epic. [facility]   Vitamin D:  Completed - 10/18/17 results in Epic. [facility]   Hgb A1c:  Completed - 10/18/17 = 6.0.[facility]   PTH:  Completed - 10/18/17 results in Epic. [facility]      Consults/ Clearance: Sleep Medicine:  Needed - LUCIANO, uses CPAP, need letter of clearance; PCP plans to write a letter for sleep per Pt ([facility])   Cardiac:  Needed -     Pain: Needed - Need letter from PCP addressing pain      PCP: Establish care with PCP:  Completed -     Follow up with PCP:    -     PCP letter of support:  Completed - Letitia Vu DO-gave med changes before surgery.      Patient Education:  Information Session:  Needed - View at  "[email or web address] and take quiz. I will get an email once your quiz results are submitted. Thanks, Martita RN   Given \"Making your decision\" handout?:  Yes -     Given support group information?:  Yes -     Attended support group?:    -  Yes, in [place].   Support plan in place?:  Completed -     Research consents signed?:  Yes -        Final Tasks:  Before surgery online class:  Needed -     Before surgery online class website link:  [email or web address]   After surgery online class:  Needed -     After surgery online class website link:  [email or web address]   Nurse visit for weigh-in and information:  Needed -     Pre-assessment clinic visit with anesthesia team for H&P:  Needed -     Final labs (Hgb, plt, T&S, UA):  Needed -        Notes: (11/18/17): Reviewed pre-surgery task list with pt. Pt's PCP, Dr. Alyssa Vu at [place] in [facility], is writing a letter of support for sleep, cardiology, and pain (pt reports Dr. Shah manages all of these conditions).- We have not received letters that clear for sleep, cardiology and pain management.          "

## 2018-05-04 ENCOUNTER — TELEPHONE (OUTPATIENT)
Dept: SURGERY | Facility: CLINIC | Age: 58
End: 2018-05-04

## 2018-05-04 PROBLEM — G47.33 OSA (OBSTRUCTIVE SLEEP APNEA): Status: ACTIVE | Noted: 2018-05-04

## 2018-05-07 ENCOUNTER — MEDICAL CORRESPONDENCE (OUTPATIENT)
Dept: HEALTH INFORMATION MANAGEMENT | Facility: CLINIC | Age: 58
End: 2018-05-07

## 2018-05-29 ENCOUNTER — CARE COORDINATION (OUTPATIENT)
Dept: SURGERY | Facility: CLINIC | Age: 58
End: 2018-05-29

## 2018-05-29 DIAGNOSIS — E66.01 MORBID OBESITY (H): Primary | ICD-10-CM

## 2018-05-29 DIAGNOSIS — G47.30 SLEEP APNEA: ICD-10-CM

## 2018-05-31 ENCOUNTER — APPOINTMENT (OUTPATIENT)
Dept: SURGERY | Facility: CLINIC | Age: 58
End: 2018-05-31
Payer: COMMERCIAL

## 2018-05-31 ENCOUNTER — OFFICE VISIT (OUTPATIENT)
Dept: SURGERY | Facility: CLINIC | Age: 58
End: 2018-05-31
Payer: COMMERCIAL

## 2018-05-31 VITALS
SYSTOLIC BLOOD PRESSURE: 124 MMHG | HEIGHT: 64 IN | DIASTOLIC BLOOD PRESSURE: 72 MMHG | HEART RATE: 56 BPM | TEMPERATURE: 95.4 F | BODY MASS INDEX: 38.12 KG/M2 | OXYGEN SATURATION: 94 % | WEIGHT: 223.3 LBS

## 2018-05-31 DIAGNOSIS — E66.01 MORBID OBESITY DUE TO EXCESS CALORIES (H): Primary | ICD-10-CM

## 2018-05-31 RX ORDER — GABAPENTIN 400 MG/1
800 CAPSULE ORAL 3 TIMES DAILY
COMMUNITY
Start: 2017-09-22

## 2018-05-31 RX ORDER — GABAPENTIN 100 MG/1
300 CAPSULE ORAL 2 TIMES DAILY
COMMUNITY
Start: 2018-04-19 | End: 2018-12-20 | Stop reason: DRUGHIGH

## 2018-05-31 NOTE — NURSING NOTE
"(   Chief Complaint   Patient presents with     RECHECK     PBS    )    ( Weight: 223 lb 4.8 oz )  ( Height: 5' 3.98\" )  ( BMI (Calculated): 38.44 )  ( Initial Weight: 255 lb 6.4 oz )  ( Cumulative weight loss (lbs): 32.1 )  ( Last Visits Weight: 255 lb 6.4 oz )  ( Wt change since last visit (lbs): -32.1 )  (   )  (   )    ( BP: 124/72 )  (   )  ( Temp: 95.4  F (35.2  C) )  (   )  ( Pulse: 56 )  (   )  ( SpO2: 94 % )    (   Patient Active Problem List   Diagnosis     Degeneration of lumbar or lumbosacral intervertebral disc     Sprain of thoracic region     Myalgia and myositis     Nonspecific reaction to tuberculin skin test without active tuberculosis     Other symptoms referable to back     Hyperlipidemia     Premature menopause     Pseudogout     Adjustment disorder with mixed anxiety and depressed mood     Major depressive disorder, recurrent episode, moderate (H)     S/P total knee replacement     Advanced directives, counseling/discussion     LUCIANO (obstructive sleep apnea)    )  (   Current Outpatient Prescriptions   Medication Sig Dispense Refill     acetaminophen-codeine (TYLENOL #3) 300-30 MG Take 1 tablet by mouth every 4 hours as needed TAKE 1-2 TABLETS EVERY 4 HOURS IF NEEDED FOR PAIN 60 tablet 0     Albuterol Sulfate 108 (90 Base) MCG/ACT AEPB Inhale 2 puffs into the lungs       calcium & magnesium carbonates 311-232 MG TABS        cyclobenzaprine (FLEXERIL) 10 MG tablet Take 1 tablet (10 mg) by mouth 3 times daily as needed for muscle spasms 90 tablet 5     DULoxetine (CYMBALTA) 60 MG capsule Take 2 capsules by mouth daily. 180 capsule 3     FLUoxetine (PROZAC) 40 MG capsule Take 1 capsule by mouth daily. 20 capsule 0     FLUTICASONE PROPIONATE, NASAL, NA Spray 1 spray in nostril       gabapentin (NEURONTIN) 100 MG capsule Take 200 mg by mouth       gabapentin (NEURONTIN) 400 MG capsule Take 1 capsule by mouth       Multiple Vitamins-Minerals (MULTIVITAMIN OR) Take by mouth daily       NAPROXEN PO Take " by mouth 2 times daily as needed for moderate pain       Omega-3 Fatty Acids (FISH OIL PO)        pravastatin (PRAVACHOL) 40 MG tablet Take 1 tablet (40 mg) by mouth daily 90 tablet 3     topiramate (TOPAMAX) 25 MG tablet Take 3 tablets (75 mg) by mouth 2 times daily 180 tablet 3    )  ( Diabetes Eval:    )    ( Pain Eval:  Data Unavailable )    ( Wound Eval:       )    (   History   Smoking Status     Former Smoker     Quit date: 1/1/1980   Smokeless Tobacco     Never Used     Comment: Quit in the l980's-smoked about three years    )    ( Signed By:  David Odell; May 31, 2018; 9:22 AM )

## 2018-05-31 NOTE — PATIENT INSTRUCTIONS
It was a pleasure meeting with you today.     Thank you for allowing us the privilege of caring for you. We hope we provided you with the excellent service you deserve.     Please let us know if there is anything else we can do for you so that we can be sure you are leaving completely satisfied with your care experience.      You saw Dr Puente today.     Instructions per today's visit:     1) call [facility] for PAC/Surgery appt [phone]      To schedule appointments with our team, please call [phone] option #1    Please call during clinic hours Monday through Friday 8:00a - 4:00p if you have questions or you can contact us via [employer] at anytime.      Nurses: [phone] Option # 3 for nurse advice line.  Fax: [phone]  Surgery Scheduler: [phone]    Please call the hospital at [phone] to speak with our on call MDs if you have urgent needs after hours, during weekends, or holidays.    *For patients who are currently enrolled in our program and have not yet had weight loss surgery please note*:    You must be at your required goal weight at the time of your Anesthesia clinic visit as well as the day of surgery or your surgery will be cancelled. You will not be able to obtain a new surgery date until you have met your goal weight.    Weight loss medications before and after surgery protocol:    Adipex (phentermine): Stop two days before surgery. Do not restart after surgery. May reconsider restarting as needed in the future.    Topimax (topiramate): Can take right up to surgery including morning of surgery and restart post op day #1.    Qsymia (phentermine/topiramate): Hold morning of surgery. Restart after surgery post op day #1    Contrave (bupropion/naltrxone): Fast taper before surgery. 1 tablet twice daily for 1 week and then discontinue 4 days before surgery.  Will reconsider restarting at postop appt once no longer taking narcotic pain meds.  Will slowly ramp up again.    Naltrexone: Stop 4  days before surgery.  Will reconsider starting again at postop appts when no longer taking narcotic pain meds.    Belviq (locaserin): Stop 2 days before surgery and restart immediately after surgery    Victoza(liraglutide)/Saxenda(liraglutide 3mg)/Trulicity (dulaglutide) (Any GLP-1): Can take up to surgery date and restart immediately after surgery.    Main follow-up parameters for all bariatric patients     Patients who have undergone Bariatric surgery:    1. Follow-up interval during the first year: ~1 week, 1 month, 3 month, 6 month,12 months.  Every 6 months until 5 years; and then annually thereafter.  The goal of follow-up sessions is to ensure that food intake behavior (choice of food and eating speed) and exercise/activity level are set appropriately.    2. Yearly lab tests ordered by our clinic.      3. The bariatric team should be aware and potentially evaluate all adverse gastrointestinal symptoms (dysphagia, abdominal pain, nausea, vomiting, diarrhea, heartburn, reflux, etc), which can be a sign of complication.  The bariatric surgeons perform general surgery procedures in addition to bariatric surgery (laparoscopic cholecystectomy, etc.)    4. Inability to tolerate textured food (chicken, steak, fish, eggs, beans) is NOT normal and may need to be evaluated by endoscopy performed by the bariatric surgeon.

## 2018-05-31 NOTE — MR AVS SNAPSHOT
After Visit Summary   5/31/2018    Dara Anderson    MRN: [id number]           Patient Information     Date Of Birth          1960        Visit Information        Provider Department      5/31/2018 9:40 AM Jhonny Puente MD [facility]        Care Instructions    It was a pleasure meeting with you today.     Thank you for allowing us the privilege of caring for you. We hope we provided you with the excellent service you deserve.     Please let us know if there is anything else we can do for you so that we can be sure you are leaving completely satisfied with your care experience.      You saw Dr Puente today.     Instructions per today's visit:     1) call [facility] for PAC/Surgery appt [phone]      To schedule appointments with our team, please call [phone] option #1    Please call during clinic hours Monday through Friday 8:00a - 4:00p if you have questions or you can contact us via [employer] at anytime.      Nurses: [phone] Option # 3 for nurse advice line.  Fax: [phone]  Surgery Scheduler: [phone]    Please call the hospital at [phone] to speak with our on call MDs if you have urgent needs after hours, during weekends, or holidays.    *For patients who are currently enrolled in our program and have not yet had weight loss surgery please note*:    You must be at your required goal weight at the time of your Anesthesia clinic visit as well as the day of surgery or your surgery will be cancelled. You will not be able to obtain a new surgery date until you have met your goal weight.    Weight loss medications before and after surgery protocol:    Adipex (phentermine): Stop two days before surgery. Do not restart after surgery. May reconsider restarting as needed in the future.    Topimax (topiramate): Can take right up to surgery including morning of surgery and restart post op day #1.    Qsymia (phentermine/topiramate): Hold morning of  surgery. Restart after surgery post op day #1    Contrave (bupropion/naltrxone): Fast taper before surgery. 1 tablet twice daily for 1 week and then discontinue 4 days before surgery.  Will reconsider restarting at postop appt once no longer taking narcotic pain meds.  Will slowly ramp up again.    Naltrexone: Stop 4 days before surgery.  Will reconsider starting again at postop appts when no longer taking narcotic pain meds.    Belviq (locaserin): Stop 2 days before surgery and restart immediately after surgery    Victoza(liraglutide)/Saxenda(liraglutide 3mg)/Trulicity (dulaglutide) (Any GLP-1): Can take up to surgery date and restart immediately after surgery.    Main follow-up parameters for all bariatric patients     Patients who have undergone Bariatric surgery:    1. Follow-up interval during the first year: ~1 week, 1 month, 3 month, 6 month,12 months.  Every 6 months until 5 years; and then annually thereafter.  The goal of follow-up sessions is to ensure that food intake behavior (choice of food and eating speed) and exercise/activity level are set appropriately.    2. Yearly lab tests ordered by our clinic.      3. The bariatric team should be aware and potentially evaluate all adverse gastrointestinal symptoms (dysphagia, abdominal pain, nausea, vomiting, diarrhea, heartburn, reflux, etc), which can be a sign of complication.  The bariatric surgeons perform general surgery procedures in addition to bariatric surgery (laparoscopic cholecystectomy, etc.)    4. Inability to tolerate textured food (chicken, steak, fish, eggs, beans) is NOT normal and may need to be evaluated by endoscopy performed by the bariatric surgeon.                        Follow-ups after your visit        Who to contact     Please call your clinic at [phone] to:    Ask questions about your health    Make or cancel appointments    Discuss your medicines    Learn about your test results    Speak to your doctor            Additional  "Information About Your Visit        [employer] Information     [employer] gives you secure access to your electronic health record. If you see a primary care provider, you can also send messages to your care team and make appointments. If you have questions, please call your primary care clinic.  If you do not have a primary care provider, please call [phone] and they will assist you.      [employer] is an electronic gateway that provides easy, online access to your medical records. With [employer], you can request a clinic appointment, read your test results, renew a prescription or communicate with your care team.     To access your existing account, please contact your [facility] or call [phone] for assistance.        Care EveryWhere ID     This is your Care EveryWhere ID. This could be used by other organizations to access your [place] medical records  [id number]        Your Vitals Were     Pulse Temperature Height Pulse Oximetry BMI (Body Mass Index)       56 95.4  F (35.2  C) 5' 3.98\" 94% 38.36 kg/m2        Blood Pressure from Last 3 Encounters:   05/31/18 124/72   10/18/17 127/75   01/17/14 118/68    Weight from Last 3 Encounters:   05/31/18 223 lb 4.8 oz   05/01/18 227 lb 3.2 oz   04/02/18 236 lb 6.4 oz              We Performed the Following     Mammogram - HIM Scan          Today's Medication Changes          These changes are accurate as of 5/31/18 10:30 AM.  If you have any questions, ask your nurse or doctor.               Stop taking these medicines if you haven't already. Please contact your care team if you have questions.     ibuprofen 600 MG tablet   Commonly known as:  ADVIL/MOTRIN   Stopped by:  Jhonny Puente MD           nortriptyline 50 MG capsule   Commonly known as:  PAMELOR   Stopped by:  Jhonny Puente MD                    Primary Care Provider Office Phone # Fax #    Letitia PETERSEN DO Horace [phone]       [facility] [address] [facility] [address]  [facility] [address]        Equal Access to Services     ALISSONDOUG DANITZA : Hadii aad ku hadchristiane Sojulissa, waaxda luqadaha, qaybta kaalmada eileen, candie michaelin hayaagabe loftongeronimo mata azul cook. So [facility] [phone].    ATENCIÓN: Si habla español, tiene a coronel disposición servicios gratuitos de asistencia lingüística. [facility] [phone].    We comply with applicable federal civil rights laws and [place] laws. We do not discriminate on the basis of race, color, national origin, age, disability, sex, sexual orientation, or gender identity.            Thank you!     Thank you for choosing [facility] SURG[facility] MANAGEMENT  for your care. Our goal is always to provide you with excellent care. Hearing back from our patients is one way we can continue to improve our services. Please take a few minutes to complete the written survey that you may receive in the mail after your visit with us. Thank you!             Your Updated Medication List - Protect others around you: Learn how to safely use, store and throw away your medicines at www.disposemymeds.org.          This list is accurate as of 5/31/18 10:30 AM.  Always use your most recent med list.                   Brand Name Dispense Instructions for use Diagnosis    acetaminophen-codeine 300-30 MG ED starter pack    TYLENOL #3    60 tablet    Take 1 tablet by mouth every 4 hours as needed TAKE 1-2 TABLETS EVERY 4 HOURS IF NEEDED FOR PAIN    Follow-up examination, following other surgery       Albuterol Sulfate 108 (90 Base) MCG/ACT Aepb      Inhale 2 puffs into the lungs        calcium & magnesium carbonates 311-232 MG Tabs           cyclobenzaprine 10 MG tablet    FLEXERIL    90 tablet    Take 1 tablet (10 mg) by mouth 3 times daily as needed for muscle spasms    Other symptoms referable to back       DULoxetine 60 MG EC capsule    CYMBALTA    180 capsule    Take 2 capsules by mouth daily.        FISH OIL PO           FLUoxetine 40 MG capsule    PROzac     20 capsule    Take 1 capsule by mouth daily.    Depressive disorder, not elsewhere classified       FLUTICASONE PROPIONATE (NASAL) NA      Spray 1 spray in nostril        * gabapentin 400 MG capsule    NEURONTIN     Take 1 capsule by mouth        * gabapentin 100 MG capsule    NEURONTIN     Take 200 mg by mouth        MULTIVITAMIN PO      Take by mouth daily        NAPROXEN PO      Take by mouth 2 times daily as needed for moderate pain        pravastatin 40 MG tablet    PRAVACHOL    90 tablet    Take 1 tablet (40 mg) by mouth daily    Other and unspecified hyperlipidemia       topiramate 25 MG tablet    TOPAMAX    180 tablet    Take 3 tablets (75 mg) by mouth 2 times daily    Morbid obesity (H)       * Notice:  This list has 2 medication(s) that are the same as other medications prescribed for you. Read the directions carefully, and ask your doctor or other care provider to review them with you.

## 2018-05-31 NOTE — PROGRESS NOTES
"Dear Dr. Vu,      Referring provider:       10/16/2017   Who referred you? Dr. Svetlana Vu     I was asked to see the patient regarding obesity by the referring provider above.    I had the pleasure of meeting with your patient Dara Anderson in our weight loss surgery office.  This patient is a 58 year old female who has been undergoing our thorough preoperative screening process in anticipation of potential bariatric surgery.    At initial evaluation we recorded Dara Anderson's initial weight: 115.8 kg (255 lb 6.4 oz) (BMI 43.87). The patient has been unsuccessful with other methods of permanent weight loss and suffers from multiple weight related medical conditions.  Due to lack of success in achieving weight loss through other methods, she is interested in undergoing bariatric surgery. Her main reason for wanting surgery is to help with back and knee pain. We had asked her to lose 10 lbs and she has lost nearly 32 lbs. She has used Topamax to help with medical weight loss and she has tolerated this well.     PREVIOUS WEIGHT LOSS ATTEMPTS:     10/16/2017   I have tried the following methods to lose weight Exercise       CO-MORBIDITIES OF OBESITY INCLUDE:     10/16/2017   I have the following co-morbidities associated with obesity: Pre-Diabetes, High Cholesterol, Sleep Apnea, Weight Bearing Joint Pain   Do you use a CPAP? Yes       VITALS:  /72  Pulse 56  Temp 95.4  F (35.2  C)  Ht 1.625 m (5' 3.98\")  Wt 101.3 kg (223 lb 4.8 oz)  SpO2 94%  BMI 38.36 kg/m2    PE:  GENERAL: Alert and oriented x3. NAD  HEENT exam: Sclerae not icteric. Hearing good. Head normocephalic and atraumatic.   CARDIOVASCULAR: No JVD  RESPIRATORY: Breathing unlabored  GASTROINTESTINAL: Obese. Left paramedian lower incision, hysterectomy incision. No appreciable hernias  LOWER EXTREMITIES: no deformities  MUSCULOSKELETAL: Normal gait, Moves all 4 extremities equal and strong  NEUROLOGIC: no gross defect  SKIN: " warm and dry to touch     In summary, she has undergone an appropriate medical evaluation, dietitian evaluation, as well as psychologic screening. The patient appears to be an appropriate candidate for bariatric surgery.    In the office today, I discussed the laparoscopic gastric sleeve surgery.  Risks, benefits and anticipated outcomes were outlined including the risk of death, staple line leak (1-2%), PE, DVT, ulcer, worsening GERD, N/V, stricture, hernia, wound infection, weight regain, and vitamin deficiencies. This patient has a good chance of sustaining permanent weight loss due to this procedure.  This should also allow improvement if not resolution of his/her weight related medical conditions.    At present we are going to present your patient's file for prior authorization to insurance.  She will need clearance from cardiology and sleep medicine. All of her other requirements have been met. Pending prior authorization, I anticipate a surgical date in the near future.  We will keep you updated on any progress.  If you have questions regarding care please feel free to contact me.  Total time spent in the clinic was 25 minutes with greater than 50% in face-to-face consultation.    Sincerely,        Jhonny Puente MD  Surgery  [phone] (hospital )  [phone] (clinic nurses)    Please route or send letter to:  Primary Care Provider (PCP) and Referring Provider

## 2018-05-31 NOTE — NURSING NOTE
This patient is having Sleeve by Dr. Puente.    The following handouts were reviewed with the patient :  Before Your Surgery, Patient Checklist, Weight Loss Surgery Pre-operative Class, Preop Recommendations Quick Reference Guide, History and Physical, Medications to Avoid, Shower or Bathing Before Surgery, Bowel Preparation, Powerful Choices and [place] [employer] Directive.  Questions were addressed and understanding of content was verbalized.  Contact information was provided.    Patient goal weight: 245  Weight today: 223      Call chip for surgery date and pac

## 2018-05-31 NOTE — LETTER
"5/31/2018     RE: Dara Anderson  [address] [facility] [facility] [address]     Dear Colleague,    Thank you for referring your patient, Dara Anderson, to the [facility]NAG[facility] at [facility]. Please see a copy of my visit note below.    Dear Dr. Vu,    Referring provider:       10/16/2017   Who referred you? Dr. Svetlana Vu     I was asked to see the patient regarding obesity by the referring provider above.    I had the pleasure of meeting with your patient Dara Anderson in our weight loss surgery office.  This patient is a 58 year old female who has been undergoing our thorough preoperative screening process in anticipation of potential bariatric surgery.    At initial evaluation we recorded Dara Anderson's initial weight: 115.8 kg (255 lb 6.4 oz) (BMI 43.87). The patient has been unsuccessful with other methods of permanent weight loss and suffers from multiple weight related medical conditions.  Due to lack of success in achieving weight loss through other methods, she is interested in undergoing bariatric surgery. Her main reason for wanting surgery is to help with back and knee pain. We had asked her to lose 10 lbs and she has lost nearly 32 lbs. She has used Topamax to help with medical weight loss and she has tolerated this well.     PREVIOUS WEIGHT LOSS ATTEMPTS:     10/16/2017   I have tried the following methods to lose weight Exercise       CO-MORBIDITIES OF OBESITY INCLUDE:     10/16/2017   I have the following co-morbidities associated with obesity: Pre-Diabetes, High Cholesterol, Sleep Apnea, Weight Bearing Joint Pain   Do you use a CPAP? Yes     VITALS:  /72  Pulse 56  Temp 95.4  F (35.2  C)  Ht 1.625 m (5' 3.98\")  Wt 101.3 kg (223 lb 4.8 oz)  SpO2 94%  BMI 38.36 kg/m2    PE:  GENERAL: Alert and oriented x3. NAD  HEENT exam: Sclerae not icteric. Hearing good. Head normocephalic and atraumatic. "   CARDIOVASCULAR: No JVD  RESPIRATORY: Breathing unlabored  GASTROINTESTINAL: Obese. Left paramedian lower incision, hysterectomy incision. No appreciable hernias  LOWER EXTREMITIES: no deformities  MUSCULOSKELETAL: Normal gait, Moves all 4 extremities equal and strong  NEUROLOGIC: no gross defect  SKIN: warm and dry to touch     In summary, she has undergone an appropriate medical evaluation, dietitian evaluation, as well as psychologic screening. The patient appears to be an appropriate candidate for bariatric surgery.    In the office today, I discussed the laparoscopic gastric sleeve surgery.  Risks, benefits and anticipated outcomes were outlined including the risk of death, staple line leak (1-2%), PE, DVT, ulcer, worsening GERD, N/V, stricture, hernia, wound infection, weight regain, and vitamin deficiencies. This patient has a good chance of sustaining permanent weight loss due to this procedure.  This should also allow improvement if not resolution of his/her weight related medical conditions.    At present we are going to present your patient's file for prior authorization to insurance.  She will need clearance from cardiology and sleep medicine. All of her other requirements have been met. Pending prior authorization, I anticipate a surgical date in the near future.  We will keep you updated on any progress.  If you have questions regarding care please feel free to contact me.  Total time spent in the clinic was 25 minutes with greater than 50% in face-to-face consultation.    Sincerely,    Jhonny Puente MD  Surgery  [phone] (hospital )  [phone] (clinic nurses)  Please route or send letter to:  Primary Care Provider (PCP) and Referring Provider

## 2018-06-08 ENCOUNTER — TRANSFERRED RECORDS (OUTPATIENT)
Dept: HEALTH INFORMATION MANAGEMENT | Facility: CLINIC | Age: 58
End: 2018-06-08

## 2018-07-19 ENCOUNTER — TRANSFERRED RECORDS (OUTPATIENT)
Dept: HEALTH INFORMATION MANAGEMENT | Facility: CLINIC | Age: 58
End: 2018-07-19

## 2018-07-24 ENCOUNTER — TRANSFERRED RECORDS (OUTPATIENT)
Dept: HEALTH INFORMATION MANAGEMENT | Facility: CLINIC | Age: 58
End: 2018-07-24

## 2018-07-26 ENCOUNTER — TRANSFERRED RECORDS (OUTPATIENT)
Dept: HEALTH INFORMATION MANAGEMENT | Facility: CLINIC | Age: 58
End: 2018-07-26

## 2018-07-27 ENCOUNTER — TRANSFERRED RECORDS (OUTPATIENT)
Dept: HEALTH INFORMATION MANAGEMENT | Facility: CLINIC | Age: 58
End: 2018-07-27

## 2018-08-21 ENCOUNTER — ALLIED HEALTH/NURSE VISIT (OUTPATIENT)
Dept: SURGERY | Facility: CLINIC | Age: 58
End: 2018-08-21
Payer: COMMERCIAL

## 2018-08-21 ENCOUNTER — APPOINTMENT (OUTPATIENT)
Dept: SURGERY | Facility: CLINIC | Age: 58
End: 2018-08-21
Payer: COMMERCIAL

## 2018-08-21 ENCOUNTER — OFFICE VISIT (OUTPATIENT)
Dept: SURGERY | Facility: CLINIC | Age: 58
End: 2018-08-21
Payer: COMMERCIAL

## 2018-08-21 ENCOUNTER — ANESTHESIA EVENT (OUTPATIENT)
Dept: SURGERY | Facility: CLINIC | Age: 58
DRG: 621 | End: 2018-08-21
Payer: MEDICARE

## 2018-08-21 ENCOUNTER — CARE COORDINATION (OUTPATIENT)
Dept: SURGERY | Facility: CLINIC | Age: 58
End: 2018-08-21

## 2018-08-21 VITALS
BODY MASS INDEX: 36.25 KG/M2 | HEIGHT: 64 IN | DIASTOLIC BLOOD PRESSURE: 83 MMHG | SYSTOLIC BLOOD PRESSURE: 135 MMHG | RESPIRATION RATE: 18 BRPM | TEMPERATURE: 97.8 F | HEART RATE: 57 BPM | OXYGEN SATURATION: 97 % | WEIGHT: 212.3 LBS

## 2018-08-21 DIAGNOSIS — Z98.84 BARIATRIC SURGERY STATUS: ICD-10-CM

## 2018-08-21 DIAGNOSIS — Z01.818 PREOP EXAMINATION: Primary | ICD-10-CM

## 2018-08-21 LAB
ALBUMIN UR-MCNC: NEGATIVE MG/DL
AMORPH CRY #/AREA URNS HPF: ABNORMAL /HPF
APPEARANCE UR: ABNORMAL
BILIRUB UR QL STRIP: NEGATIVE
COLOR UR AUTO: YELLOW
CREAT SERPL-MCNC: 0.76 MG/DL (ref 0.52–1.04)
GFR SERPL CREATININE-BSD FRML MDRD: 78 ML/MIN/1.7M2
GLUCOSE UR STRIP-MCNC: NEGATIVE MG/DL
HGB BLD-MCNC: 13.5 G/DL (ref 11.7–15.7)
HGB UR QL STRIP: NEGATIVE
KETONES UR STRIP-MCNC: NEGATIVE MG/DL
LEUKOCYTE ESTERASE UR QL STRIP: NEGATIVE
NITRATE UR QL: NEGATIVE
PH UR STRIP: 7 PH (ref 5–7)
POTASSIUM SERPL-SCNC: 4.5 MMOL/L (ref 3.4–5.3)
RBC #/AREA URNS AUTO: 0 /HPF (ref 0–2)
SOURCE: ABNORMAL
SP GR UR STRIP: 1.01 (ref 1–1.03)
SQUAMOUS #/AREA URNS AUTO: <1 /HPF (ref 0–1)
UROBILINOGEN UR STRIP-MCNC: 0 MG/DL (ref 0–2)
WBC #/AREA URNS AUTO: 1 /HPF (ref 0–5)

## 2018-08-21 RX ORDER — PHENOL 1.4 %
1 AEROSOL, SPRAY (ML) MUCOUS MEMBRANE 3 TIMES DAILY
Status: ON HOLD | COMMUNITY
Start: 2012-02-13 | End: 2018-09-11

## 2018-08-21 ASSESSMENT — LIFESTYLE VARIABLES: TOBACCO_USE: 1

## 2018-08-21 NOTE — PATIENT INSTRUCTIONS
Preparing for Your Surgery      Name:  Dara Anderson   MRN:  [id number]   :  1960   Today's Date:  2018     Arriving for surgery:  Surgery date:  18   Arrival time:  7 am    Please come to:  [facility] [address]  [address] [place]  [address]    -   parking is available in front of the hospital from 5:15 am to 8:00 pm    -  Stop at the Information Desk in the lobby    -   Inform the information person that you are here for surgery. An escort to 3C will be provided. If you would not like an escort, please proceed to 3C on the 3rd floor. [phone]     -  Bring your ID and insurance card.    What can I eat or drink?  -  Follow Bariatric Clinic instructions for Bowel prep and Clear Liquid diet.      -  You may continue clear liquids until 12 midnight, the day of surgery.      -  Sips of water only with medication from 12 midnight until 6 am, day of surgery.    Which medicines can I take?       -  Do not bring your own medications to the hospital.        -  Follow Bariatric Clinic instructions regarding Ibuprofen. If no instructions given, NO Ibuprofen the day prior to surgery.         -  Hold Multivitamin, Vitamins,  and Supplements 7 days prior to surgery.        -  Hold Naproxen 2 days prior to surgery.    -  Please take these medications the morning of surgery:  Fluoxetine (Prozac), Gabapentin, Topiramate (Topamax)      Fluticasone nasal spray      Acetaminophen-Codeine (Tylenol #3) if needed    How do I prepare myself?      -  Bring CPAP.  -  Take two showers: one the night before surgery; and one the morning of surgery.         Use Scrubcare or Hibiclens to wash from neck down.  You may use your own shampoo and conditioner. No other hair products.   -  Do NOT use lotion, powder, colognes, deodorant, or antiperspirant the day of your surgery.  -  Do NOT wear any makeup, fingernail polish or jewelry.    -  Begin using Incentive Spirometer 1  week prior to surgery.  Use 4 times per day, up to 5-10 breaths each time.  Bring Incentive Spirometer to hospital.    Questions or Concerns:  If you have questions or concerns prior to your surgery, call [phone]. (Mon - Fri   8 am- 5:30 pm)  Questions after surgery, contact your Surgeons office.      AFTER YOUR SURGERY  Breathing exercises   Breathing exercises help you recover faster. Take deep breaths and let the air out slowly. This will:     Help you wake up after surgery.    Help prevent complications like pneumonia.  Preventing complications will help you go home sooner.   Nausea and vomiting   You may feel sick to your stomach after surgery; if so, let your nurse know.    Pain control:  After surgery, you may have pain. Our goal is to help you manage your pain. Pain medicine will help you feel comfortable enough to do activities that will help you heal.  These activities may include breathing exercises, walking and physical therapy.   To help your health care team treat your pain we will ask: 1) If you have pain  2) where it is located 3) describe your pain in your words  Methods of pain control include medications given by mouth, vein or by nerve block for some surgeries.  Sequential Compression Device (SCD) or Pneumo Boots:  You may need to wear SCD S on your legs or feet. These are wraps connected to a machine that pumps in air and releases it. The repeated pumping helps prevent blood clots from forming.

## 2018-08-21 NOTE — MR AVS SNAPSHOT
After Visit Summary   2018    Dara Anderson    MRN: [id number]           Patient Information     Date Of Birth          1960        Visit Information        Provider Department      2018 12:30 PM Rn, [facility]        Care Instructions    Preparing for Your Surgery      Name:  Dara Anderson   MRN:  [id number]   :  1960   Today's Date:  2018     Arriving for surgery:  Surgery date:  18   Arrival time:  7 am    Please come to:  [facility] [address]  [address] [place]  [address]    -   parking is available in front of the hospital from 5:15 am to 8:00 pm    -  Stop at the Information Desk in the lobby    -   Inform the information person that you are here for surgery. An escort to 3C will be provided. If you would not like an escort, please proceed to 3C on the 3rd floor. [phone]     -  Bring your ID and insurance card.    What can I eat or drink?  -  Follow Bariatric Clinic instructions for Bowel prep and Clear Liquid diet.      -  You may continue clear liquids until 12 midnight, the day of surgery.      -  Sips of water only with medication from 12 midnight until 6 am, day of surgery.    Which medicines can I take?       -  Do not bring your own medications to the hospital.        -  Follow Bariatric Clinic instructions regarding Ibuprofen. If no instructions given, NO Ibuprofen the day prior to surgery.         -  Hold Multivitamin, Vitamins,  and Supplements 7 days prior to surgery.        -  Hold Naproxen 2 days prior to surgery.    -  Please take these medications the morning of surgery:  Fluoxetine (Prozac), Gabapentin, Topiramate (Topamax)      Fluticasone nasal spray      Acetaminophen-Codeine (Tylenol #3) if needed    How do I prepare myself?      -  Bring CPAP.  -  Take two showers: one the night before surgery; and one the morning of surgery.         Use  Scrubcare or Hibiclens to wash from neck down.  You may use your own shampoo and conditioner. No other hair products.   -  Do NOT use lotion, powder, colognes, deodorant, or antiperspirant the day of your surgery.  -  Do NOT wear any makeup, fingernail polish or jewelry.    -  Begin using Incentive Spirometer 1 week prior to surgery.  Use 4 times per day, up to 5-10 breaths each time.  Bring Incentive Spirometer to hospital.    Questions or Concerns:  If you have questions or concerns prior to your surgery, call [phone]. (Mon - Fri   8 am- 5:30 pm)  Questions after surgery, contact your Surgeons office.      AFTER YOUR SURGERY  Breathing exercises   Breathing exercises help you recover faster. Take deep breaths and let the air out slowly. This will:     Help you wake up after surgery.    Help prevent complications like pneumonia.  Preventing complications will help you go home sooner.   Nausea and vomiting   You may feel sick to your stomach after surgery; if so, let your nurse know.    Pain control:  After surgery, you may have pain. Our goal is to help you manage your pain. Pain medicine will help you feel comfortable enough to do activities that will help you heal.  These activities may include breathing exercises, walking and physical therapy.   To help your health care team treat your pain we will ask: 1) If you have pain  2) where it is located 3) describe your pain in your words  Methods of pain control include medications given by mouth, vein or by nerve block for some surgeries.  Sequential Compression Device (SCD) or Pneumo Boots:  You may need to wear SCD S on your legs or feet. These are wraps connected to a machine that pumps in air and releases it. The repeated pumping helps prevent blood clots from forming.                     Follow-ups after your visit        Your next 10 appointments already scheduled     Aug 21, 2018  1:00 PM CDT   (Arrive by 12:45 PM)   PAC Anesthesia Consult with  Pac  Anesthesiologist   [facility] ([facility])    [address]  4th Floor  [facility] [id number] [phone]            Sep 11, 2018   Procedure with Jhonny Puente MD   [facility], [place], Same Day Surgery (--)    [address] [facility] [id number] [phone]              Future tests that were ordered for you today     Open Future Orders        Priority Expected Expires Ordered    UA reflex to Microscopic and Culture Routine 8/21/2018 9/20/2018 8/21/2018    Creatinine Routine 8/21/2018 9/20/2018 8/21/2018    Potassium Routine 8/21/2018 9/20/2018 8/21/2018    Hemoglobin Routine 8/21/2018 9/20/2018 8/21/2018            Who to contact     Please call your clinic at [phone] to:    Ask questions about your health    Make or cancel appointments    Discuss your medicines    Learn about your test results    Speak to your doctor            Additional Information About Your Visit        [employer] Information     [employer] gives you secure access to your electronic health record. If you see a primary care provider, you can also send messages to your care team and make appointments. If you have questions, please call your primary care clinic.  If you do not have a primary care provider, please call [phone] and they will assist you.      [employer] is an electronic gateway that provides easy, online access to your medical records. With [employer], you can request a clinic appointment, read your test results, renew a prescription or communicate with your care team.     To access your existing account, please contact your [facility] or call [phone] for assistance.        Care EveryWhere ID     This is your Care EveryWhere ID. This could be used by other organizations to access your [place] medical records  [id number]         Blood Pressure from Last 3 Encounters:   08/21/18 135/83   05/31/18 124/72   10/18/17 127/75     Weight from Last 3 Encounters:   08/21/18 96.3 kg (212 lb 4.8 oz)   05/31/18 101.3 kg (223 lb 4.8 oz)   05/01/18 103.1 kg (227 lb 3.2 oz)              Today, you had the following     No orders found for display         Today's Medication Changes          These changes are accurate as of 8/21/18 12:36 PM.  If you have any questions, ask your nurse or doctor.               These medicines have changed or have updated prescriptions.        Dose/Directions    acetaminophen-codeine 300-30 MG ED starter pack   Commonly known as:  TYLENOL #3   This may have changed:    - when to take this  - additional instructions   Used for:  Follow-up examination, following other surgery        Dose:  1 tablet   Take 1 tablet by mouth every 4 hours as needed TAKE 1-2 TABLETS EVERY 4 HOURS IF NEEDED FOR PAIN   Quantity:  60 tablet   Refills:  0       cyclobenzaprine 10 MG tablet   Commonly known as:  FLEXERIL   This may have changed:  when to take this   Used for:  Other symptoms referable to back        Dose:  10 mg   Take 1 tablet (10 mg) by mouth 3 times daily as needed for muscle spasms   Quantity:  90 tablet   Refills:  5       DULoxetine 60 MG EC capsule   Commonly known as:  CYMBALTA   This may have changed:  when to take this        Dose:  120 mg   Take 2 capsules by mouth daily.   Quantity:  180 capsule   Refills:  3       FLUoxetine 40 MG capsule   Commonly known as:  PROzac   This may have changed:  when to take this   Used for:  Depressive disorder, not elsewhere classified        Dose:  40 mg   Take 1 capsule by mouth daily.   Quantity:  20 capsule   Refills:  0       pravastatin 40 MG tablet   Commonly known as:  PRAVACHOL   This may have changed:  when to take this   Used for:  Other and unspecified hyperlipidemia        Dose:  40 mg   Take 1 tablet (40 mg) by mouth daily   Quantity:  90 tablet   Refills:  3                Primary Care Provider Office Phone # Fax #    Letitia Vu [phone]        [facility] [place] [address]  [place] [facility] [facility] [address]        Equal Access to Services     ALISSONDOUG DANITZA : Hadii aad ku haddebbieo Soelvaali, waaxda luqadaha, qaybta kaalmada kassandrada, candie michaelin hayaagabe loftongeronimo mata laOdilonmarquis cook. So [facility] [phone].    ATENCIÓN: Si habla español, tiene a coronel disposición servicios gratuitos de asistencia lingüística. Llame al [phone].    We comply with applicable federal civil rights laws and [place] laws. We do not discriminate on the basis of race, color, national origin, age, disability, sex, sexual orientation, or gender identity.            Thank you!     Thank you for choosing [facility]  for your care. Our goal is always to provide you with excellent care. Hearing back from our patients is one way we can continue to improve our services. Please take a few minutes to complete the written survey that you may receive in the mail after your visit with us. Thank you!             Your Updated Medication List - Protect others around you: Learn how to safely use, store and throw away your medicines at www.disposemymeds.org.          This list is accurate as of 8/21/18 12:36 PM.  Always use your most recent med list.                   Brand Name Dispense Instructions for use Diagnosis    acetaminophen-codeine 300-30 MG ED starter pack    TYLENOL #3    60 tablet    Take 1 tablet by mouth every 4 hours as needed TAKE 1-2 TABLETS EVERY 4 HOURS IF NEEDED FOR PAIN    Follow-up examination, following other surgery       ALEVE PO      Take 220 mg by mouth as needed for moderate pain        calcium carbonate 600 MG tablet   Generic drug:  calcium carbonate      Take 1 tablet by mouth 3 times daily        cyclobenzaprine 10 MG tablet    FLEXERIL    90 tablet    Take 1 tablet (10 mg) by mouth 3 times daily as needed for muscle spasms    Other symptoms referable to back       DULoxetine 60 MG EC capsule    CYMBALTA    180 capsule    Take 2 capsules by mouth daily.         FISH OIL PO      Take by mouth 3 times daily        FLUoxetine 40 MG capsule    PROzac    20 capsule    Take 1 capsule by mouth daily.    Depressive disorder, not elsewhere classified       FLUTICASONE PROPIONATE (NASAL) NA      Spray 1 spray in nostril as needed        * gabapentin 400 MG capsule    NEURONTIN     Take 1 capsule by mouth 3 times daily        * gabapentin 100 MG capsule    NEURONTIN     Take 300 mg by mouth 2 times daily        MULTIVITAMIN PO      Take by mouth daily (with breakfast)        pravastatin 40 MG tablet    PRAVACHOL    90 tablet    Take 1 tablet (40 mg) by mouth daily    Other and unspecified hyperlipidemia       topiramate 25 MG tablet    TOPAMAX    180 tablet    Take 3 tablets (75 mg) by mouth 2 times daily    Morbid obesity (H)       * Notice:  This list has 2 medication(s) that are the same as other medications prescribed for you. Read the directions carefully, and ask your doctor or other care provider to review them with you.
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
## 2018-08-21 NOTE — PROGRESS NOTES
Tasklist updated.    Bariatric Task List  Status:  Is patient a candidate for bariatric surgery?:  Yes - possible   Cleared to schedule surgeon consult?:  Yes -     Status:  surgery evaluation in process -     Surgeon: Dr Puente -     Tentative surgery month/year: September 2018 -        Insurance: Insurance:  [employer] -        Patient Info: Initial Weight:  255.4 lbs -     Date of Initial Weight/Height:  10/18/2017 -     Goal Weight (lbs):  245 -     Required Weight Loss:  10 -     Surgery Type:  sleeve gastrectomy -        Dietician Visits: Structured weight loss required by insurance?:  Yes -     Dietician Visit 1:  Completed - 10/18/17 in [facility]. BKS   Dietician Visit 2:  Completed - 11/18/17: Complete- ND   Dietician Visit 3:  Completed - 12/15/17 appt. (slj)   Dietician Visit 4:  Completed - 1/19/18 (BS)   Dietician Visit 5:  Completed - 2/16/18 (BS)    Dietician Visit 6:  Completed - 3/14/18 appt.      Psychological Evaluation: Psych eval:  Completed - 2/2/18 Dr Gould appt.5/1/18 Cleared.   Other:    -        Lab Work: Complete Blood Count:  Completed - 10/18/17 results in [facility]. BKS   Comprehensive Metabolic Panel:  Completed - 10/18/17 results in Epic. BKS   Vitamin D:  Completed - 10/18/17 results in Epic. BKS   Hgb A1c:  Completed - 10/18/17 = 6.0.BKS   PTH:  Completed - 10/18/17 results in Epic. BKS      Consults/ Clearance: Sleep Medicine:  Completed -  Zohaib Coleman MD-cleared   Cardiac:  Completed - 6/8/18 Jimmie Pascaul - cleared.   Pain: Completed - 10/25/17 Ortho clearance - Dr Isaak Mooney for back &knee pain      Testing: Other:  Completed - 7/19/18 PSG at [facility]      PCP: Establish care with PCP:  Completed -     Follow up with PCP:    -     PCP letter of support:  Completed - Letitia Vu DO-gave med changes before surgery.      Smoking: Quit tobacco use (3 months smoke free)?:    -     Quit date:    -        Patient Education:  Information Session:  Needed - View at [email or web address] and  "take quiz. I will get an email once your quiz results are submitted. Thanks, WINNIE Anders   Given \"Making your decision\" handout?:  Yes -     Given support group information?:  Yes -     Attended support group?:    -     Support plan in place?:  Completed -     Research consents signed?:  Yes -        Final Tasks:  Before surgery online class:  Needed -     Before surgery online class website link:  [email or web address]   After surgery online class:  Needed -     After surgery online class website link:  [email or web address]   Nurse visit for weigh-in and information:  Needed -     Pre-assessment clinic visit with anesthesia team for H&P:  Needed -     Final labs (Hgb, plt, T&S, UA):  Needed -        Notes: (11/18/17): Reviewed pre-surgery task list with pt. Pt's PCP, Dr. Alyssa Vu at [facility], is writing a letter of support for sleep, cardiology, and pain (pt reports Dr. Shah manages all of these conditions). Pt is scheduled with Rosey cyr 2/2/18 for psych eval. - ND -           "
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
## 2018-08-21 NOTE — OR NURSING
PAC visit Date 8/21/2018   Patient Goal Weight 245 pounds  Actual Weight 212 pounds 4.8 ounces   Met goal weight  Yes

## 2018-09-11 ENCOUNTER — ANESTHESIA (OUTPATIENT)
Dept: SURGERY | Facility: CLINIC | Age: 58
DRG: 621 | End: 2018-09-11
Payer: MEDICARE

## 2018-09-11 ENCOUNTER — APPOINTMENT (OUTPATIENT)
Dept: GENERAL RADIOLOGY | Facility: CLINIC | Age: 58
DRG: 621 | End: 2018-09-11
Attending: ANESTHESIOLOGY
Payer: MEDICARE

## 2018-09-11 ENCOUNTER — HOSPITAL ENCOUNTER (INPATIENT)
Facility: CLINIC | Age: 58
LOS: 1 days | Discharge: HOME | DRG: 621 | End: 2018-09-12
Attending: SURGERY | Admitting: SURGERY
Payer: MEDICARE

## 2018-09-11 DIAGNOSIS — E66.01 MORBID (SEVERE) OBESITY DUE TO EXCESS CALORIES (H): Primary | ICD-10-CM

## 2018-09-11 LAB
ABO + RH BLD: NORMAL
ABO + RH BLD: NORMAL
BLD GP AB SCN SERPL QL: NORMAL
BLOOD BANK CMNT PATIENT-IMP: NORMAL
CREAT SERPL-MCNC: 0.63 MG/DL (ref 0.52–1.04)
GFR SERPL CREATININE-BSD FRML MDRD: >90 ML/MIN/1.7M2
GLUCOSE BLDC GLUCOMTR-MCNC: 99 MG/DL (ref 70–99)
HGB BLD-MCNC: 14.6 G/DL (ref 11.7–15.7)
PLATELET # BLD AUTO: 280 10E9/L (ref 150–450)
SPECIMEN EXP DATE BLD: NORMAL
TROPONIN I SERPL-MCNC: <0.015 UG/L (ref 0–0.04)
TROPONIN I SERPL-MCNC: <0.015 UG/L (ref 0–0.04)

## 2018-09-11 PROCEDURE — 25000128 H RX IP 250 OP 636: Performed by: NURSE ANESTHETIST, CERTIFIED REGISTERED

## 2018-09-11 PROCEDURE — 0DB64Z3 EXCISION OF STOMACH, PERCUTANEOUS ENDOSCOPIC APPROACH, VERTICAL: ICD-10-PCS | Performed by: SURGERY

## 2018-09-11 PROCEDURE — 93005 ELECTROCARDIOGRAM TRACING: CPT

## 2018-09-11 PROCEDURE — 93010 ELECTROCARDIOGRAM REPORT: CPT | Performed by: INTERNAL MEDICINE

## 2018-09-11 PROCEDURE — 86901 BLOOD TYPING SEROLOGIC RH(D): CPT | Performed by: ANESTHESIOLOGY

## 2018-09-11 PROCEDURE — 36000062 ZZH SURGERY LEVEL 4 1ST 30 MIN - UMMC: Performed by: SURGERY

## 2018-09-11 PROCEDURE — 25000128 H RX IP 250 OP 636: Performed by: SURGERY

## 2018-09-11 PROCEDURE — 25000132 ZZH RX MED GY IP 250 OP 250 PS 637: Mod: GY | Performed by: ANESTHESIOLOGY

## 2018-09-11 PROCEDURE — A9270 NON-COVERED ITEM OR SERVICE: HCPCS | Mod: GY | Performed by: SURGERY

## 2018-09-11 PROCEDURE — 84484 ASSAY OF TROPONIN QUANT: CPT | Performed by: ANESTHESIOLOGY

## 2018-09-11 PROCEDURE — 71000014 ZZH RECOVERY PHASE 1 LEVEL 2 FIRST HR: Performed by: SURGERY

## 2018-09-11 PROCEDURE — 25000125 ZZHC RX 250: Performed by: NURSE ANESTHETIST, CERTIFIED REGISTERED

## 2018-09-11 PROCEDURE — A9270 NON-COVERED ITEM OR SERVICE: HCPCS | Mod: GY | Performed by: ANESTHESIOLOGY

## 2018-09-11 PROCEDURE — 36415 COLL VENOUS BLD VENIPUNCTURE: CPT | Performed by: SURGERY

## 2018-09-11 PROCEDURE — 37000009 ZZH ANESTHESIA TECHNICAL FEE, EACH ADDTL 15 MIN: Performed by: SURGERY

## 2018-09-11 PROCEDURE — 25000132 ZZH RX MED GY IP 250 OP 250 PS 637: Mod: GY | Performed by: SURGERY

## 2018-09-11 PROCEDURE — 12000003 ZZH R&B CRITICAL UMMC

## 2018-09-11 PROCEDURE — 85049 AUTOMATED PLATELET COUNT: CPT | Performed by: SURGERY

## 2018-09-11 PROCEDURE — 25000125 ZZHC RX 250: Performed by: PHYSICIAN ASSISTANT

## 2018-09-11 PROCEDURE — 86900 BLOOD TYPING SEROLOGIC ABO: CPT | Performed by: ANESTHESIOLOGY

## 2018-09-11 PROCEDURE — 25000128 H RX IP 250 OP 636: Performed by: ANESTHESIOLOGY

## 2018-09-11 PROCEDURE — 40000141 ZZH STATISTIC PERIPHERAL IV START W/O US GUIDANCE

## 2018-09-11 PROCEDURE — 40000170 ZZH STATISTIC PRE-PROCEDURE ASSESSMENT II: Performed by: SURGERY

## 2018-09-11 PROCEDURE — 36415 COLL VENOUS BLD VENIPUNCTURE: CPT | Performed by: ANESTHESIOLOGY

## 2018-09-11 PROCEDURE — 71000015 ZZH RECOVERY PHASE 1 LEVEL 2 EA ADDTL HR: Performed by: SURGERY

## 2018-09-11 PROCEDURE — 00000146 ZZHCL STATISTIC GLUCOSE BY METER IP

## 2018-09-11 PROCEDURE — 84484 ASSAY OF TROPONIN QUANT: CPT | Performed by: INTERNAL MEDICINE

## 2018-09-11 PROCEDURE — 93010 ELECTROCARDIOGRAM REPORT: CPT | Mod: 77 | Performed by: INTERNAL MEDICINE

## 2018-09-11 PROCEDURE — A9270 NON-COVERED ITEM OR SERVICE: HCPCS | Mod: GY | Performed by: STUDENT IN AN ORGANIZED HEALTH CARE EDUCATION/TRAINING PROGRAM

## 2018-09-11 PROCEDURE — 88305 TISSUE EXAM BY PATHOLOGIST: CPT | Performed by: SURGERY

## 2018-09-11 PROCEDURE — 85018 HEMOGLOBIN: CPT | Performed by: ANESTHESIOLOGY

## 2018-09-11 PROCEDURE — 25000565 ZZH ISOFLURANE, EA 15 MIN: Performed by: SURGERY

## 2018-09-11 PROCEDURE — 37000008 ZZH ANESTHESIA TECHNICAL FEE, 1ST 30 MIN: Performed by: SURGERY

## 2018-09-11 PROCEDURE — 25000125 ZZHC RX 250: Performed by: SURGERY

## 2018-09-11 PROCEDURE — 86850 RBC ANTIBODY SCREEN: CPT | Performed by: ANESTHESIOLOGY

## 2018-09-11 PROCEDURE — 27210794 ZZH OR GENERAL SUPPLY STERILE: Performed by: SURGERY

## 2018-09-11 PROCEDURE — 25000132 ZZH RX MED GY IP 250 OP 250 PS 637: Mod: GY | Performed by: STUDENT IN AN ORGANIZED HEALTH CARE EDUCATION/TRAINING PROGRAM

## 2018-09-11 PROCEDURE — 71045 X-RAY EXAM CHEST 1 VIEW: CPT

## 2018-09-11 PROCEDURE — 36000064 ZZH SURGERY LEVEL 4 EA 15 ADDTL MIN - UMMC: Performed by: SURGERY

## 2018-09-11 PROCEDURE — 25000128 H RX IP 250 OP 636: Performed by: STUDENT IN AN ORGANIZED HEALTH CARE EDUCATION/TRAINING PROGRAM

## 2018-09-11 PROCEDURE — 82565 ASSAY OF CREATININE: CPT | Performed by: SURGERY

## 2018-09-11 PROCEDURE — 36415 COLL VENOUS BLD VENIPUNCTURE: CPT | Performed by: INTERNAL MEDICINE

## 2018-09-11 RX ORDER — GABAPENTIN 400 MG/1
400 CAPSULE ORAL EVERY EVENING
Status: DISCONTINUED | OUTPATIENT
Start: 2018-09-11 | End: 2018-09-12 | Stop reason: HOSPADM

## 2018-09-11 RX ORDER — CLINDAMYCIN PHOSPHATE 900 MG/50ML
900 INJECTION, SOLUTION INTRAVENOUS
Status: COMPLETED | OUTPATIENT
Start: 2018-09-11 | End: 2018-09-11

## 2018-09-11 RX ORDER — LIDOCAINE 40 MG/G
CREAM TOPICAL
Status: DISCONTINUED | OUTPATIENT
Start: 2018-09-11 | End: 2018-09-12 | Stop reason: HOSPADM

## 2018-09-11 RX ORDER — ACETAMINOPHEN 325 MG/1
650 TABLET ORAL EVERY 4 HOURS PRN
Status: DISCONTINUED | OUTPATIENT
Start: 2018-09-14 | End: 2018-09-12 | Stop reason: HOSPADM

## 2018-09-11 RX ORDER — ONDANSETRON 2 MG/ML
4 INJECTION INTRAMUSCULAR; INTRAVENOUS EVERY 30 MIN PRN
Status: DISCONTINUED | OUTPATIENT
Start: 2018-09-11 | End: 2018-09-11 | Stop reason: HOSPADM

## 2018-09-11 RX ORDER — KETOROLAC TROMETHAMINE 30 MG/ML
30 INJECTION, SOLUTION INTRAMUSCULAR; INTRAVENOUS EVERY 6 HOURS
Status: COMPLETED | OUTPATIENT
Start: 2018-09-11 | End: 2018-09-12

## 2018-09-11 RX ORDER — ASPIRIN 325 MG
325 TABLET ORAL DAILY
Status: DISCONTINUED | OUTPATIENT
Start: 2018-09-11 | End: 2018-09-11 | Stop reason: HOSPADM

## 2018-09-11 RX ORDER — GABAPENTIN 300 MG/1
300 CAPSULE ORAL 2 TIMES DAILY
Status: DISCONTINUED | OUTPATIENT
Start: 2018-09-11 | End: 2018-09-11

## 2018-09-11 RX ORDER — HYDROMORPHONE HYDROCHLORIDE 1 MG/ML
.3-.5 INJECTION, SOLUTION INTRAMUSCULAR; INTRAVENOUS; SUBCUTANEOUS EVERY 5 MIN PRN
Status: DISCONTINUED | OUTPATIENT
Start: 2018-09-11 | End: 2018-09-11 | Stop reason: HOSPADM

## 2018-09-11 RX ORDER — CLINDAMYCIN PHOSPHATE 900 MG/50ML
900 INJECTION, SOLUTION INTRAVENOUS SEE ADMIN INSTRUCTIONS
Status: DISCONTINUED | OUTPATIENT
Start: 2018-09-11 | End: 2018-09-11 | Stop reason: HOSPADM

## 2018-09-11 RX ORDER — OXYCODONE HYDROCHLORIDE 5 MG/1
5-10 TABLET ORAL
Status: DISCONTINUED | OUTPATIENT
Start: 2018-09-11 | End: 2018-09-12 | Stop reason: HOSPADM

## 2018-09-11 RX ORDER — DIPHENHYDRAMINE HYDROCHLORIDE 50 MG/ML
25 INJECTION INTRAMUSCULAR; INTRAVENOUS EVERY 6 HOURS PRN
Status: DISCONTINUED | OUTPATIENT
Start: 2018-09-11 | End: 2018-09-12 | Stop reason: HOSPADM

## 2018-09-11 RX ORDER — ACETAMINOPHEN 325 MG/1
650 TABLET ORAL EVERY 6 HOURS PRN
Qty: 100 TABLET | Refills: 0 | Status: SHIPPED | OUTPATIENT
Start: 2018-09-11

## 2018-09-11 RX ORDER — ACETAMINOPHEN 325 MG/1
975 TABLET ORAL EVERY 8 HOURS
Status: DISCONTINUED | OUTPATIENT
Start: 2018-09-11 | End: 2018-09-12 | Stop reason: HOSPADM

## 2018-09-11 RX ORDER — EPHEDRINE SULFATE 50 MG/ML
INJECTION, SOLUTION INTRAMUSCULAR; INTRAVENOUS; SUBCUTANEOUS PRN
Status: DISCONTINUED | OUTPATIENT
Start: 2018-09-11 | End: 2018-09-11

## 2018-09-11 RX ORDER — SODIUM CHLORIDE, SODIUM LACTATE, POTASSIUM CHLORIDE, CALCIUM CHLORIDE 600; 310; 30; 20 MG/100ML; MG/100ML; MG/100ML; MG/100ML
INJECTION, SOLUTION INTRAVENOUS CONTINUOUS
Status: DISCONTINUED | OUTPATIENT
Start: 2018-09-11 | End: 2018-09-11 | Stop reason: HOSPADM

## 2018-09-11 RX ORDER — OXYCODONE HYDROCHLORIDE 5 MG/1
5 TABLET ORAL EVERY 4 HOURS PRN
Qty: 20 TABLET | Refills: 0 | Status: SHIPPED | OUTPATIENT
Start: 2018-09-11 | End: 2018-09-20

## 2018-09-11 RX ORDER — FENTANYL CITRATE 50 UG/ML
25-50 INJECTION, SOLUTION INTRAMUSCULAR; INTRAVENOUS
Status: DISCONTINUED | OUTPATIENT
Start: 2018-09-11 | End: 2018-09-11 | Stop reason: HOSPADM

## 2018-09-11 RX ORDER — ONDANSETRON 4 MG/1
4 TABLET, FILM COATED ORAL EVERY 4 HOURS PRN
Qty: 30 TABLET | Refills: 0 | Status: SHIPPED | OUTPATIENT
Start: 2018-09-11 | End: 2019-06-06

## 2018-09-11 RX ORDER — FLUOXETINE 10 MG/1
40 TABLET, FILM COATED ORAL EVERY MORNING
Status: DISCONTINUED | OUTPATIENT
Start: 2018-09-12 | End: 2018-09-12 | Stop reason: HOSPADM

## 2018-09-11 RX ORDER — NALOXONE HYDROCHLORIDE 0.4 MG/ML
.1-.4 INJECTION, SOLUTION INTRAMUSCULAR; INTRAVENOUS; SUBCUTANEOUS
Status: DISCONTINUED | OUTPATIENT
Start: 2018-09-11 | End: 2018-09-11 | Stop reason: HOSPADM

## 2018-09-11 RX ORDER — PROPOFOL 10 MG/ML
INJECTION, EMULSION INTRAVENOUS PRN
Status: DISCONTINUED | OUTPATIENT
Start: 2018-09-11 | End: 2018-09-11

## 2018-09-11 RX ORDER — HYOSCYAMINE SULFATE 0.125 MG
0.125-0.25 TABLET ORAL EVERY 4 HOURS PRN
Qty: 60 TABLET | Refills: 0 | Status: SHIPPED | OUTPATIENT
Start: 2018-09-11 | End: 2019-06-06

## 2018-09-11 RX ORDER — FENTANYL CITRATE 50 UG/ML
INJECTION, SOLUTION INTRAMUSCULAR; INTRAVENOUS PRN
Status: DISCONTINUED | OUTPATIENT
Start: 2018-09-11 | End: 2018-09-11

## 2018-09-11 RX ORDER — FLUMAZENIL 0.1 MG/ML
0.2 INJECTION, SOLUTION INTRAVENOUS
Status: DISCONTINUED | OUTPATIENT
Start: 2018-09-11 | End: 2018-09-11 | Stop reason: HOSPADM

## 2018-09-11 RX ORDER — NALOXONE HYDROCHLORIDE 0.4 MG/ML
.1-.4 INJECTION, SOLUTION INTRAMUSCULAR; INTRAVENOUS; SUBCUTANEOUS
Status: DISCONTINUED | OUTPATIENT
Start: 2018-09-11 | End: 2018-09-12 | Stop reason: HOSPADM

## 2018-09-11 RX ORDER — METOCLOPRAMIDE HYDROCHLORIDE 5 MG/ML
10 INJECTION INTRAMUSCULAR; INTRAVENOUS EVERY 6 HOURS PRN
Status: DISCONTINUED | OUTPATIENT
Start: 2018-09-11 | End: 2018-09-12 | Stop reason: HOSPADM

## 2018-09-11 RX ORDER — PRAVASTATIN SODIUM 20 MG
40 TABLET ORAL
Status: DISCONTINUED | OUTPATIENT
Start: 2018-09-11 | End: 2018-09-12 | Stop reason: HOSPADM

## 2018-09-11 RX ORDER — ONDANSETRON 4 MG/1
4 TABLET, ORALLY DISINTEGRATING ORAL EVERY 30 MIN PRN
Status: DISCONTINUED | OUTPATIENT
Start: 2018-09-11 | End: 2018-09-11 | Stop reason: HOSPADM

## 2018-09-11 RX ORDER — SODIUM CHLORIDE, SODIUM LACTATE, POTASSIUM CHLORIDE, CALCIUM CHLORIDE 600; 310; 30; 20 MG/100ML; MG/100ML; MG/100ML; MG/100ML
INJECTION, SOLUTION INTRAVENOUS CONTINUOUS
Status: DISCONTINUED | OUTPATIENT
Start: 2018-09-11 | End: 2018-09-12 | Stop reason: HOSPADM

## 2018-09-11 RX ORDER — LIDOCAINE HYDROCHLORIDE 20 MG/ML
INJECTION, SOLUTION INFILTRATION; PERINEURAL PRN
Status: DISCONTINUED | OUTPATIENT
Start: 2018-09-11 | End: 2018-09-11

## 2018-09-11 RX ORDER — CYCLOBENZAPRINE HCL 5 MG
10 TABLET ORAL 3 TIMES DAILY PRN
Status: DISCONTINUED | OUTPATIENT
Start: 2018-09-11 | End: 2018-09-12 | Stop reason: HOSPADM

## 2018-09-11 RX ORDER — METOCLOPRAMIDE 10 MG/1
10 TABLET ORAL EVERY 6 HOURS PRN
Status: DISCONTINUED | OUTPATIENT
Start: 2018-09-11 | End: 2018-09-12 | Stop reason: HOSPADM

## 2018-09-11 RX ORDER — ONDANSETRON 2 MG/ML
4 INJECTION INTRAMUSCULAR; INTRAVENOUS EVERY 6 HOURS PRN
Status: DISCONTINUED | OUTPATIENT
Start: 2018-09-11 | End: 2018-09-12 | Stop reason: HOSPADM

## 2018-09-11 RX ORDER — PROCHLORPERAZINE MALEATE 5 MG
10 TABLET ORAL EVERY 6 HOURS PRN
Status: DISCONTINUED | OUTPATIENT
Start: 2018-09-11 | End: 2018-09-12 | Stop reason: HOSPADM

## 2018-09-11 RX ORDER — AMOXICILLIN 250 MG
1 CAPSULE ORAL 2 TIMES DAILY
Status: DISCONTINUED | OUTPATIENT
Start: 2018-09-11 | End: 2018-09-12 | Stop reason: HOSPADM

## 2018-09-11 RX ORDER — ONDANSETRON 4 MG/1
4 TABLET, ORALLY DISINTEGRATING ORAL EVERY 6 HOURS PRN
Status: DISCONTINUED | OUTPATIENT
Start: 2018-09-11 | End: 2018-09-12 | Stop reason: HOSPADM

## 2018-09-11 RX ORDER — HYDRALAZINE HYDROCHLORIDE 20 MG/ML
10 INJECTION INTRAMUSCULAR; INTRAVENOUS EVERY 6 HOURS PRN
Status: DISCONTINUED | OUTPATIENT
Start: 2018-09-11 | End: 2018-09-12 | Stop reason: HOSPADM

## 2018-09-11 RX ORDER — DIPHENHYDRAMINE HCL 25 MG
25 CAPSULE ORAL EVERY 6 HOURS PRN
Status: DISCONTINUED | OUTPATIENT
Start: 2018-09-11 | End: 2018-09-12 | Stop reason: HOSPADM

## 2018-09-11 RX ORDER — AMOXICILLIN 250 MG
2 CAPSULE ORAL 2 TIMES DAILY
Status: DISCONTINUED | OUTPATIENT
Start: 2018-09-11 | End: 2018-09-12 | Stop reason: HOSPADM

## 2018-09-11 RX ORDER — MORPHINE SULFATE 2 MG/ML
2-4 INJECTION, SOLUTION INTRAMUSCULAR; INTRAVENOUS
Status: DISCONTINUED | OUTPATIENT
Start: 2018-09-11 | End: 2018-09-12 | Stop reason: HOSPADM

## 2018-09-11 RX ORDER — LIDOCAINE 40 MG/G
CREAM TOPICAL
Status: DISCONTINUED | OUTPATIENT
Start: 2018-09-11 | End: 2018-09-11 | Stop reason: HOSPADM

## 2018-09-11 RX ORDER — AMOXICILLIN 250 MG
1 CAPSULE ORAL 2 TIMES DAILY
Qty: 28 TABLET | Refills: 0 | Status: SHIPPED | OUTPATIENT
Start: 2018-09-11 | End: 2018-09-20

## 2018-09-11 RX ORDER — BUPIVACAINE HYDROCHLORIDE 2.5 MG/ML
INJECTION, SOLUTION EPIDURAL; INFILTRATION; INTRACAUDAL PRN
Status: DISCONTINUED | OUTPATIENT
Start: 2018-09-11 | End: 2018-09-11 | Stop reason: HOSPADM

## 2018-09-11 RX ORDER — ONDANSETRON 2 MG/ML
INJECTION INTRAMUSCULAR; INTRAVENOUS PRN
Status: DISCONTINUED | OUTPATIENT
Start: 2018-09-11 | End: 2018-09-11

## 2018-09-11 RX ORDER — NALOXONE HYDROCHLORIDE 0.4 MG/ML
.1-.4 INJECTION, SOLUTION INTRAMUSCULAR; INTRAVENOUS; SUBCUTANEOUS
Status: DISCONTINUED | OUTPATIENT
Start: 2018-09-11 | End: 2018-09-11

## 2018-09-11 RX ORDER — DULOXETIN HYDROCHLORIDE 60 MG/1
120 CAPSULE, DELAYED RELEASE ORAL AT BEDTIME
Status: DISCONTINUED | OUTPATIENT
Start: 2018-09-11 | End: 2018-09-12 | Stop reason: HOSPADM

## 2018-09-11 RX ADMIN — GABAPENTIN 400 MG: 400 CAPSULE ORAL at 19:25

## 2018-09-11 RX ADMIN — MORPHINE SULFATE 2 MG: 2 INJECTION, SOLUTION INTRAMUSCULAR; INTRAVENOUS at 11:58

## 2018-09-11 RX ADMIN — FENTANYL CITRATE 50 MCG: 50 INJECTION, SOLUTION INTRAMUSCULAR; INTRAVENOUS at 08:59

## 2018-09-11 RX ADMIN — ASPIRIN 325 MG ORAL TABLET 325 MG: 325 PILL ORAL at 11:35

## 2018-09-11 RX ADMIN — KETOROLAC TROMETHAMINE 30 MG: 30 INJECTION, SOLUTION INTRAMUSCULAR at 17:01

## 2018-09-11 RX ADMIN — ONDANSETRON 4 MG: 2 INJECTION INTRAMUSCULAR; INTRAVENOUS at 09:56

## 2018-09-11 RX ADMIN — FENTANYL CITRATE 50 MCG: 50 INJECTION, SOLUTION INTRAMUSCULAR; INTRAVENOUS at 09:28

## 2018-09-11 RX ADMIN — FENTANYL CITRATE 25 MCG: 50 INJECTION INTRAMUSCULAR; INTRAVENOUS at 11:21

## 2018-09-11 RX ADMIN — MIDAZOLAM 2 MG: 1 INJECTION INTRAMUSCULAR; INTRAVENOUS at 08:47

## 2018-09-11 RX ADMIN — PROPOFOL 50 MG: 10 INJECTION, EMULSION INTRAVENOUS at 09:03

## 2018-09-11 RX ADMIN — Medication 5 MG: at 09:50

## 2018-09-11 RX ADMIN — LIDOCAINE HYDROCHLORIDE 100 MG: 20 INJECTION, SOLUTION INFILTRATION; PERINEURAL at 08:59

## 2018-09-11 RX ADMIN — HYDRALAZINE HYDROCHLORIDE 10 MG: 20 INJECTION INTRAMUSCULAR; INTRAVENOUS at 21:57

## 2018-09-11 RX ADMIN — ACETAMINOPHEN 975 MG: 325 TABLET, FILM COATED ORAL at 17:02

## 2018-09-11 RX ADMIN — PHENYLEPHRINE HYDROCHLORIDE 100 MCG: 10 INJECTION, SOLUTION INTRAMUSCULAR; INTRAVENOUS; SUBCUTANEOUS at 09:21

## 2018-09-11 RX ADMIN — Medication 5 MG: at 09:44

## 2018-09-11 RX ADMIN — ROCURONIUM BROMIDE 50 MG: 10 INJECTION INTRAVENOUS at 08:59

## 2018-09-11 RX ADMIN — PROPOFOL 150 MG: 10 INJECTION, EMULSION INTRAVENOUS at 08:59

## 2018-09-11 RX ADMIN — SODIUM CHLORIDE, POTASSIUM CHLORIDE, SODIUM LACTATE AND CALCIUM CHLORIDE: 600; 310; 30; 20 INJECTION, SOLUTION INTRAVENOUS at 11:58

## 2018-09-11 RX ADMIN — SUGAMMADEX 100 MG: 100 INJECTION, SOLUTION INTRAVENOUS at 09:59

## 2018-09-11 RX ADMIN — FENTANYL CITRATE 50 MCG: 50 INJECTION, SOLUTION INTRAMUSCULAR; INTRAVENOUS at 10:05

## 2018-09-11 RX ADMIN — SUGAMMADEX 200 MG: 100 INJECTION, SOLUTION INTRAVENOUS at 09:57

## 2018-09-11 RX ADMIN — SENNOSIDES AND DOCUSATE SODIUM 1 TABLET: 8.6; 5 TABLET ORAL at 19:25

## 2018-09-11 RX ADMIN — KETOROLAC TROMETHAMINE 30 MG: 30 INJECTION, SOLUTION INTRAMUSCULAR at 21:45

## 2018-09-11 RX ADMIN — PHENYLEPHRINE HYDROCHLORIDE 100 MCG: 10 INJECTION, SOLUTION INTRAMUSCULAR; INTRAVENOUS; SUBCUTANEOUS at 09:50

## 2018-09-11 RX ADMIN — FENTANYL CITRATE 25 MCG: 50 INJECTION INTRAMUSCULAR; INTRAVENOUS at 11:31

## 2018-09-11 RX ADMIN — FENTANYL CITRATE 50 MCG: 50 INJECTION INTRAMUSCULAR; INTRAVENOUS at 11:28

## 2018-09-11 RX ADMIN — CLINDAMYCIN PHOSPHATE 900 MG: 18 INJECTION, SOLUTION INTRAVENOUS at 09:10

## 2018-09-11 RX ADMIN — ROCURONIUM BROMIDE 50 MG: 10 INJECTION INTRAVENOUS at 09:03

## 2018-09-11 RX ADMIN — DULOXETINE HYDROCHLORIDE 120 MG: 60 CAPSULE, DELAYED RELEASE ORAL at 21:45

## 2018-09-11 RX ADMIN — SODIUM CHLORIDE, POTASSIUM CHLORIDE, SODIUM LACTATE AND CALCIUM CHLORIDE: 600; 310; 30; 20 INJECTION, SOLUTION INTRAVENOUS at 08:47

## 2018-09-11 RX ADMIN — PRAVASTATIN SODIUM 40 MG: 20 TABLET ORAL at 19:25

## 2018-09-11 RX ADMIN — Medication 5 MG: at 09:46

## 2018-09-11 RX ADMIN — TOPIRAMATE 75 MG: 50 TABLET ORAL at 19:25

## 2018-09-11 ASSESSMENT — ACTIVITIES OF DAILY LIVING (ADL)
ADLS_ACUITY_SCORE: 9
ADLS_ACUITY_SCORE: 9

## 2018-09-11 ASSESSMENT — PAIN DESCRIPTION - DESCRIPTORS: DESCRIPTORS: DISCOMFORT

## 2018-09-11 NOTE — ANESTHESIA CARE TRANSFER NOTE
Patient: Dara Anderson    Procedure(s):  Laparoscopic Sleeve Gastrectomy Latex Free - Wound Class: II-Clean Contaminated    Diagnosis: Morbid Obesity   Diagnosis Additional Information: No value filed.    Anesthesia Type:   General, ETT     Note:  Airway :Face Mask  Patient transferred to:PACU  Comments: Patient alert, appropriate, VSS, O2 per FM. Pt still c/o being very warm, despite temp monitor reading 34.0. Refusing warming blanket. Will continue to monitor and assess.Handoff Report: Identifed the Patient, Identified the Reponsible Provider, Reviewed the pertinent medical history, Discussed the surgical course, Reviewed Intra-OP anesthesia mangement and issues during anesthesia, Set expectations for post-procedure period and Allowed opportunity for questions and acknowledgement of understanding      Vitals: (Last set prior to Anesthesia Care Transfer)    CRNA VITALS  9/11/2018 0936 - 9/11/2018 1015      9/11/2018             Pulse: 63    SpO2: 100 %                Electronically Signed By: TAMMI Brunner CRNA  September 11, 2018  10:15 AM

## 2018-09-11 NOTE — IP AVS SNAPSHOT
[address] [address]    Phone:  [phone]                                       After Visit Summary   9/11/2018    Dara Anderson    MRN: [id number]           After Visit Summary Signature Page     I have received my discharge instructions, and my questions have been answered. I have discussed any challenges I see with this plan with the nurse or doctor.    ..........................................................................................................................................  Patient/Patient Representative Signature      ..........................................................................................................................................  Patient Representative Print Name and Relationship to Patient    ..................................................               ................................................  Date                                   Time    ..........................................................................................................................................  Reviewed by Signature/Title    ...................................................              ..............................................  Date                                               Time          22EPIC Rev 08/18

## 2018-09-11 NOTE — OR NURSING
Okay to transfer pt to [address] per Dr Puente.  No cardiac monitoring ordered.  Cardiology consult ordered.

## 2018-09-11 NOTE — CONSULTS
Cardiology Consult                                                               2018  Dara Anderson MRN: [id number]  Age: 58 year old, : 1960        Reason for consult:      Chest pain        Assessment and Recommendation:     The patient is a 58-year-old female with a past medical history significant for morbid obesity with whom cardiology is consulted for chest pain following gastric sleeve placement.    1.  Chest pain: The patient's description of her pain is nonanginal in nature.  Her risk factors include obesity and hypercholesterolemia.  She has a negative troponin and only subtle EKG changes.  Highly suspect that her pain is related to sleeve placement, however cannot definitively rule out acute coronary syndrome.  We recommend the following:    -Check troponin every 6 hours for 3 total times  -Check echocardiogram  -Check EKG with next troponin check  -If all of these are normal, there is no further cardiac workup needed     Patient discussed with staff attending, Dr. Blue and the note reflects our joint plan. Thank you for consulting the cardiovascular services at the [facility]. Please do not hesitate to call with questions or concerns.     Buddy Avalos MD    PGY-4, Cardiology Fellow  Pager: [phone]        History of Present Illness:     The patient is a 58-year-old female with a past medical history significant for morbid obesity and hypercholesterolemia.  She presented today for an elective outpatient procedure to have a gastric sleeve placed laparoscopically.  Following her successful sleeve placement, the patient was noted to have chest pain.  Per the PACU anesthesiologist, the patient developed chest pain upon awaking from her anesthesia.  The patient describes the pain as a burning quality, located to her sub-sternum.  It is nonradiating. No association with nausea or vomiting. Prior to this encounter, she was able to walk up and  down steps without issue.  She did not have any dyspnea on exertion.  She further has no symptoms of orthopnea, PND, or palpitations.  She has had no previous ischemic evaluation.    A comprehensive 12 point review of systems was completed and relevant findings are noted in the HPI.      Past Medical History:     Patient Active Problem List   Diagnosis     Degeneration of lumbar or lumbosacral intervertebral disc     Sprain of thoracic region     Myalgia and myositis     Nonspecific reaction to tuberculin skin test without active tuberculosis     Other symptoms referable to back     Hyperlipidemia     Premature menopause     Pseudogout     Adjustment disorder with mixed anxiety and depressed mood     Major depressive disorder, recurrent episode, moderate (H)     S/P total knee replacement     Advanced directives, counseling/discussion     LUCIANO (obstructive sleep apnea)     Morbid (severe) obesity due to excess calories (H)         Past Surgical History:      Past Surgical History:   Procedure Laterality Date     C APPENDECTOMY  1960's     C FUSION OF SACROILIAC JOINT  11/21/06    RT sacroiliac fusion with bone morphogenic protein II and Titanium interposition device - [facility]     C INTERMITTENT ASTHMA  11/19/03    asthmatic bronchitis     C LUMBAR SPINE FUSN,POST INTRBDY  04/2001    [place] L4-L5     C TOTAL ABDOM HYSTERECTOMY  05/1990     C TOTAL KNEE ARTHROPLASTY  7/16/13    RT     C UNLISTED ANESTH PROCEDURE  6/17/09    excision of old wound reclosure     C UNLISTED ANESTH PROCEDURE  6/17/09    revision fusion L2-L3, L3-L4, remove internal fixation devices, pedicle screws L2, L3, L4 (R) facet screws L5-S1 bilateral, application of Medtronic BMP 2 application of cancellous allograft and removal of mole (L) back.     HC BIOPSY SOFT TISSUE NECK/THORAX  1990     HC COLONOSCOPY W BIOPSY  1/3/13     HC KNEE SCOPE, DIAGNOSTIC      WITH CYLINDER CAST APPLICATION     HC KNEE SCOPE,SHAVE ARTICULAR CART  3/15/11    RT      HC REMOVAL OF TONSILS,<11 Y/O           Social History:     Social History     Social History     Marital status: Single     Spouse name: N/A     Number of children: 0     Years of education: 13     Occupational History     Disabled       [employer]     Social History Main Topics     Smoking status: Former Smoker     Quit date: 1/1/1980     Smokeless tobacco: Never Used      Comment: Quit in the l980's-smoked about three years     Alcohol use Yes      Comment: RARE     Drug use: No     Sexual activity: Not Currently     Birth control/ protection: Surgical      Comment: hyst-1990     Other Topics Concern      Service No     Blood Transfusions Yes     possibly     Caffeine Concern No     Occupational Exposure No     Hobby Hazards No     Sleep Concern No     Stress Concern Yes     back pain issues     Weight Concern Yes     Special Diet No     Back Care Yes     Exercise No     Bike Helmet No     Seat Belt Yes     Self-Exams Yes     Social History Narrative    womens health kit given.         Family History:     Family History   Problem Relation Age of Onset     Hypertension Mother      EYE* Mother      CATARACT     Hypertension Maternal Grandmother      Asthma Maternal Grandmother      Diabetes Maternal Grandfather      ADDM/ADDM     Diabetes Paternal Grandfather      ADDM     Diabetes Paternal Grandmother      ADDM     EYE* Brother      LAZY EYE     Breast Cancer No family hx of      Cancer - colorectal No family hx of      C.A.D. No family hx of      Cerebrovascular Disease No family hx of      Cancer No family hx of      Blood Disease No family hx of      Prostate Cancer No family hx of      Anesthesia Reaction No family hx of      Eye Disorder No family hx of      HEART DISEASE No family hx of      Lipids No family hx of      Respiratory No family hx of      Thyroid Disease No family hx of          Allergies:     Allergies   Allergen Reactions     Indomethacin      Indocin/Vomiting      Propoxyphene Other (See Comments)     Unknown.      Septra Ds [Sulfamethoxazole W-Trimethoprim] Rash     Penicillins Rash     PENICILLINS - RASH  RASH         Medications:       No current facility-administered medications on file prior to encounter.   Current Outpatient Prescriptions on File Prior to Encounter:  cyclobenzaprine (FLEXERIL) 10 MG tablet Take 1 tablet (10 mg) by mouth 3 times daily as needed for muscle spasms (Patient taking differently: Take 10 mg by mouth as needed for muscle spasms )   DULoxetine (CYMBALTA) 60 MG capsule Take 2 capsules by mouth daily. (Patient taking differently: Take 120 mg by mouth At Bedtime )   FLUoxetine (PROZAC) 40 MG capsule Take 1 capsule by mouth daily. (Patient taking differently: Take 40 mg by mouth every morning )   gabapentin (NEURONTIN) 100 MG capsule Take 300 mg by mouth 2 times daily    topiramate (TOPAMAX) 25 MG tablet Take 3 tablets (75 mg) by mouth 2 times daily   FLUTICASONE PROPIONATE, NASAL, NA Spray 1 spray in nostril as needed    gabapentin (NEURONTIN) 400 MG capsule Take 1 capsule by mouth 3 times daily            Physical Exam:     B/P: 174/89, T: 95.7, P: 61, R: 17    Wt Readings from Last 4 Encounters:   09/11/18 96.4 kg (212 lb 8.4 oz)   08/21/18 96.3 kg (212 lb 4.8 oz)   05/31/18 101.3 kg (223 lb 4.8 oz)   05/01/18 103.1 kg (227 lb 3.2 oz)         Intake/Output Summary (Last 24 hours) at 09/11/18 1634  Last data filed at 09/11/18 1500   Gross per 24 hour   Intake              875 ml   Output               10 ml   Net              865 ml       Gen: AA&Ox3, no acute distress  HEENT: no JVD  PULM/THORAX: Clear to auscultation bilaterally, no rales/rhonchi/wheezes  CV: RRR, S1 and S2 appreciated, no extra heart sounds, murmurs or rub auscultated  ABD: obese, soft, nontender, nondistended, no HSM appreciated.  EXT: No LE edema  NEURO: non focal      Data:     Labs Reviewed on Admission    Troponin Lab Results   Component Value Date    TROPI <0.015  09/11/2018     BMP  Recent Labs  Lab 09/11/18  1457   CR 0.63     CBC  Recent Labs  Lab 09/11/18  1457 09/11/18  0740   HGB  --  14.6     --      INRNo lab results found in last 7 days.   Hepatic Panel   Lab Results   Component Value Date    AST 22 10/18/2017     Lab Results   Component Value Date    ALT 28 10/18/2017     Lab Results   Component Value Date    BILICONJ 0.2 01/18/2006      Lab Results   Component Value Date    BILITOTAL 0.3 10/18/2017     Lab Results   Component Value Date    ALBUMIN 3.8 10/18/2017     Lab Results   Component Value Date    PROTTOTAL 7.5 10/18/2017      Lab Results   Component Value Date    ALKPHOS 88 10/18/2017           Most Recent Imaging:     ECG: non-specific t wave changes in V2V3

## 2018-09-11 NOTE — PHARMACY-CONSULT NOTE
Bariatric Consult    Medications evaluated as requested per Bariatric Consult. Patient may swallow tablets/capsules ONLY if less than   inch.  Larger tablets/capsules should be broken, crushed or split.    No medication changes were needed based on the Bariatric Medication Management Policy.    The pharmacist will continue to follow as new medications are ordered.    Gretchen Rebollar, PharmD  Pager: [phone]

## 2018-09-11 NOTE — LETTER
[facility] Advance [facility] Planning          Dara Anderson  [address] [address]    Dear Ms. Anderson,    We have reviewed the Health Care Directive dated 2/23/01 which you presented for addition to   your medical record. Unfortunately, our review indicates the document is not legally valid for   the following reason(s): You dated the document after it was witnessed/notarized.  In order to create a legal document you will need to have your signature re-notarized or witnessed by 2 people. Notaries and witnesses cannot be named as your health care agents per state law. In addition, only one of your witnesses can be employed by our health care system.  Or you may create a new document.    We greatly value the opportunity to assist you in documenting your choices and to honor your   wishes. We apologize for any inconvenience. We have several options to assist you in updating   your document so it meets legal requirements.       Health Care Directives and Advance Care Planning resources can be viewed and printed   for free at our web site:[email or web address].       Free group classes on Advance Care Planning and completing a Health Care Directive are  available at multiple locations and times. These classes are led by trained staff who will   provide information and guide you through a Health Care Directive. They can also review, notarize and add your Health Care Directive to your medical record. [place] for a class at [email or web address] or by calling [employer] at [phone] or toll free [phone].      COPIES of completed Health Care Directives can be brought or mailed to any of our   locations, including the address listed below. You can also email a copy to [email or web address] .       For additional assistance or questions you can email us at [email or web address] or call [phone]    Sincerely,     [facility] Advance Care  Planning  [facility], [facility], and [facility]  [address] [place] [address]   Email us: [email or web address] Call us: [phone]

## 2018-09-11 NOTE — PROVIDER NOTIFICATION
"BP (!) 181/93 (BP Location: Left arm)  Pulse 61  Temp 95.7  F (35.4  C) (Oral)  Resp 17  Ht 1.626 m (5' 4\")  Wt 96.4 kg (212 lb 8.4 oz)  SpO2 98%  BMI 36.48 kg/m2  MANUELA WATTS [ Msg Id [id number] ]  MD notified of: /93, HR 50s.  Requested hydralazine or antihypertensive  "

## 2018-09-11 NOTE — IP AVS SNAPSHOT
MRN:[id number]                      After Visit Summary   9/11/2018    Dara Anderson    MRN: [id number]           Thank you!     Thank you for choosing [facility] for your care. Our goal is always to provide you with excellent care. Hearing back from our patients is one way we can continue to improve our services. Please take a few minutes to complete the written survey that you may receive in the mail after you visit with us. Thank you!        Patient Information     Date Of Birth          1960        Designated Caregiver       Most Recent Value    Caregiver    Will someone help with your care after discharge? yes    Name of designated caregiver Jami    Phone number of caregiver [phone]    Caregiver address  [address] [place] [address]      About your hospital stay     You were admitted on:  September 11, 2018 You last received care in the:  [address] [facility] [facility]    You were discharged on:  September 12, 2018        Reason for your hospital stay       Morbid (severe) obesity due to excess calories (H)  (primary encounter diagnosis)                  Who to Call     For medical emergencies, please call 911.  For non-urgent questions about your medical care, please call your primary care provider or clinic, [phone]  For questions related to your surgery, please call your surgery clinic        Attending Provider     Provider Specialty    Jhonny Puente MD Surgery       Primary Care Provider Office Phone # Fax #    Letitia PETERSEN DO [place] [phone]      After Care Instructions     Discharge Instructions       Follow up  Follow-up with your surgeon team (Dr. Puente) in 1-2 weeks. This appointment was previously made for you and is scheduled as below. If you have any questions about appointment scheduling, please call [phone] and choose option #1.    You will receive a call from our clinic nurse after surgery.  If you have any questions and would like  to speak with a nurse please call [phone] and choose option #3 to speak with a triage nurse.    Appointments located at [facility]:  [address]  [address] [address]  [place] [address]    Diet  If surgery was with Dr Puente: post-op diet is bariatric clear liquids for 1 DAY and then advance to full liquids two days after surgery.      Activity  - No lifting >10 pounds for 4-6 weeks. Discuss with your surgeon at follow up appointment.  - May shower starting postoperative day #1 but no scrubbing incisions. No bathing or soaking incisions for 2 weeks or until incisions completely healed.  - No driving for at least 12 hours after taking narcotic pain medication.    Wound Care  Keep clean and dry. Steri strips or glue will fall off on their own.    When to call  Call [phone] and choose option #3 to speak with a triage nurse if you are having troubles during regular business hours.  Call [phone] and ask to speak with surgery resident if you are having troubles in the evenings, at night, or on weekends.   Please call if you experience increasing abdominal pain, nausea, vomiting, increasing drainage from your wounds, chills, or fever >101.5    Medications  After surgery it is ok to swallow medications smaller than 1/4 inch (size of pencil eraser) for all procedures.  If medication is larger than 1/4 inch then it will need to be crushed, cut or in liquid form for 1-2 months after surgery. This can be discussed with surgeon team at the 1 month follow up appointment and will depend on patient tolerance.    If you still have your gallbladder you will be given a prescription called Actigall or Ursodiol in a capsule form to prevent gallstones during rapid weight loss (this will be addressed at your first follow up appointment in 1-2 weeks).  This capsule can be opened and put into your liquids or food (when your diet progresses). You will need to take this medications for 6 months after surgery.    You  should stop taking your oral diabetes medications until instructed by a doctor to restart (because you will have a greater risk of low blood sugar after your surgery).    You should stop taking any statins for high cholesterol or any vitamins/minerals until instructed by a doctor to restart.    You should not take any NSAIDs (ex: aspirin, ibuprofen, etc.) until cleared by your surgeon.    It is recommended you take over the counter Tylenol in addition to oxycodone (narcotic medication) for pain; see discharge medications for suggestion for Tylenol as this helps with overall pain control. Once you are no longer needing oxycodone for pain control, you can decrease the Tylenol dosage and frequency and then stop taking it. Do not take more than 4,000 mg of acetaminophen (Tylenol) from all sources to prevent liver damage.     It is recommended that you take stool softener while taking narcotic pain medication to prevent constipation (senna-docusate works well, or any other over-the-counter medication you would like for stool softening).                  Follow-up Appointments     Adult [facility] Follow-up and recommended labs and tests       Follow up with Dr. Puente Bariatric surgery clinic in 1 week. Phone follow up will be arranged.    Appointments on [place] and/or [facility] (with [facility] or [facility] provider or service). Call [phone] if you haven't heard regarding these appointments within 7 days of discharge.                  Your next 10 appointments already scheduled     Sep 20, 2018 11:40 AM CDT   (Arrive by 11:25 AM)   Bariatric Post Op Global Visit with Jhonny Puente MD   [facility] ([facility])    [address] [place] [address] [phone]            Sep 20, 2018 12:00 PM CDT   (Arrive by 11:45 AM)   Return Bariatric Nutrition Visit with Vicki Crawford RD   [facility] ([facility])    [address] [address]   [phone]            Oct 18, 2018  8:45 AM CDT   (Arrive by 8:30 AM)   Bariatric Post Op Global Visit with JESSE Russell [facility] Surgical [facility] Management ([email or web address])    [address] [place] [address]   [phone]            Oct 18, 2018  9:00 AM CDT   (Arrive by 8:45 AM)   Return Bariatric Nutrition Visit with URBANO Brown [facility] ([email or web address] and Surgery [place])    [address] [address]   [phone]              Additional Information     If you use hormonal birth control (such as the pill, patch, ring or implants): You'll need a second form of birth control for 7 days (condoms, a diaphragm or contraceptive foam). While in the hospital, you received a medicine called Bridion. Your normal birth control will not work as well for a week after taking this medicine.          Pending Results     No orders found for last 3 day(s).            Statement of Approval     Ordered          09/12/18 1208  I have reviewed and agree with all the recommendations and orders detailed in this document.  EFFECTIVE NOW     Approved and electronically signed by:  Kamille Moulton MD             Admission Information     Date & Time Provider Department Dept. Phone    9/11/2018 Jhonny Puente MD [address] [facility] [phone]      Your Vitals Were     Blood Pressure Pulse Temperature Respirations Height Weight    149/70 (BP Location: Right arm) 64 97.9  F (36.6  C) (Oral) 16 1.626 m (5' 4\") 96.4 kg (212 lb 8.4 oz)    Pulse Oximetry BMI (Body Mass Index)                98% 36.48 kg/m2          MyChart Information     [facility] gives you secure access to your electronic health record. If you see a primary care provider, you can also send messages to your care team and make appointments. If you have " questions, please call your primary care clinic.  If you do not have a primary care provider, please call [phone] and they will assist you.        Care EveryWhere ID     This is your Care EveryWhere ID. This could be used by other organizations to access your [facility] medical records  [id number]        Equal Access to Services     ALISSONDOUG DANITZA : Hadii daniel zavaleta haddebbieo Soomaali, waaxda luqadaha, qaybta kaalmada eileen, candie cook. So [facility] [phone].    ATENCIÓN: Si habla español, tiene a coronel disposición servicios gratuitos de asistencia lingüística. Llame al [phone].    We comply with applicable federal civil rights laws and [place] laws. We do not discriminate on the basis of race, color, national origin, age, disability, sex, sexual orientation, or gender identity.               Review of your medicines      START taking        Dose / Directions    acetaminophen 325 MG tablet   Commonly known as:  TYLENOL        Dose:  650 mg   Take 2 tablets (650 mg) by mouth every 6 hours as needed for mild pain   Quantity:  100 tablet   Refills:  0       hyoscyamine 0.125 MG tablet   Commonly known as:  ANASPAZ/LEVSIN        Dose:  0.125-0.25 mg   Take 1-2 tablets (125-250 mcg) by mouth every 4 hours as needed for cramping   Quantity:  60 tablet   Refills:  0       omeprazole 20 MG CR capsule   Commonly known as:  priLOSEC        Dose:  20 mg   Take 1 capsule (20 mg) by mouth 2 times daily   Quantity:  60 capsule   Refills:  1       ondansetron 4 MG tablet   Commonly known as:  ZOFRAN        Dose:  4 mg   Take 1 tablet (4 mg) by mouth every 4 hours as needed for nausea   Quantity:  30 tablet   Refills:  0       oxyCODONE IR 5 MG tablet   Commonly known as:  ROXICODONE        Dose:  5 mg   Take 1 tablet (5 mg) by mouth every 4 hours as needed for moderate pain   Quantity:  20 tablet   Refills:  0       senna-docusate 8.6-50 MG per tablet   Commonly known as:  SENOKOT-S;PERICOLACE         Dose:  1 tablet   Take 1 tablet by mouth 2 times daily for 14 days   Quantity:  28 tablet   Refills:  0         CONTINUE these medicines which may have CHANGED, or have new prescriptions. If we are uncertain of the size of tablets/capsules you have at home, strength may be listed as something that might have changed.        Dose / Directions    cyclobenzaprine 10 MG tablet   Commonly known as:  FLEXERIL   This may have changed:  when to take this   Used for:  Other symptoms referable to back        Dose:  10 mg   Take 1 tablet (10 mg) by mouth 3 times daily as needed for muscle spasms   Quantity:  90 tablet   Refills:  5       DULoxetine 60 MG EC capsule   Commonly known as:  CYMBALTA   This may have changed:  when to take this        Dose:  120 mg   Take 2 capsules by mouth daily.   Quantity:  180 capsule   Refills:  3       FLUoxetine 40 MG capsule   Commonly known as:  PROzac   This may have changed:  when to take this   Used for:  Depressive disorder, not elsewhere classified        Dose:  40 mg   Take 1 capsule by mouth daily.   Quantity:  20 capsule   Refills:  0         CONTINUE these medicines which have NOT CHANGED        Dose / Directions    FLUTICASONE PROPIONATE (NASAL) NA        Dose:  1 spray   Spray 1 spray in nostril as needed   Refills:  0       * gabapentin 400 MG capsule   Commonly known as:  NEURONTIN        Dose:  1 capsule   Take 1 capsule by mouth 3 times daily   Refills:  0       * gabapentin 100 MG capsule   Commonly known as:  NEURONTIN        Dose:  300 mg   Take 300 mg by mouth 2 times daily   Refills:  0       topiramate 25 MG tablet   Commonly known as:  TOPAMAX   Used for:  Morbid obesity (H)        Dose:  75 mg   Take 3 tablets (75 mg) by mouth 2 times daily   Quantity:  180 tablet   Refills:  3       * Notice:  This list has 2 medication(s) that are the same as other medications prescribed for you. Read the directions carefully, and ask your doctor or other care provider to  review them with you.      STOP taking     acetaminophen-codeine 300-30 MG ED starter pack   Commonly known as:  TYLENOL #3           ALEVE PO           calcium carbonate 600 MG tablet   Generic drug:  calcium carbonate           FISH OIL PO           MULTIVITAMIN PO           pravastatin 40 MG tablet   Commonly known as:  PRAVACHOL                Where to get your medicines      These medications were sent to [facility] - [place], [place] - [address]  [address] [address]     Phone:  [phone]     acetaminophen 325 MG tablet    hyoscyamine 0.125 MG tablet    omeprazole 20 MG CR capsule    ondansetron 4 MG tablet    senna-docusate 8.6-50 MG per tablet         Some of these will need a paper prescription and others can be bought over the counter. Ask your nurse if you have questions.     Bring a paper prescription for each of these medications     oxyCODONE IR 5 MG tablet                Protect others around you: Learn how to safely use, store and throw away your medicines at www.disposemymeds.org.        Information about OPIOIDS     PRESCRIPTION OPIOIDS: WHAT YOU NEED TO KNOW   We gave you an opioid (narcotic) pain medicine. It is important to manage your pain, but opioids are not always the best choice. You should first try all the other options your care team gave you. Take this medicine for as short a time (and as few doses) as possible.    Some activities can increase your pain, such as bandage changes or therapy sessions. It may help to take your pain medicine 30 to 60 minutes before these activities. Reduce your stress by getting enough sleep, working on hobbies you enjoy and practicing relaxation or meditation. Talk to your care team about ways to manage your pain beyond prescription opioids.    These medicines have risks:    DO NOT drive when on new or higher doses of pain medicine. These medicines can affect your alertness and reaction times, and you could  be arrested for driving under the influence (DUI). If you need to use opioids long-term, talk to your care team about driving.    DO NOT operate heavy machinery    DO NOT do any other dangerous activities while taking these medicines.    DO NOT drink any alcohol while taking these medicines.     If the opioid prescribed includes acetaminophen, DO NOT take with any other medicines that contain acetaminophen. Read all labels carefully. Look for the word  acetaminophen  or  Tylenol.  Ask your pharmacist if you have questions or are unsure.    You can get addicted to pain medicines, especially if you have a history of addiction (chemical, alcohol or substance dependence). Talk to your care team about ways to reduce this risk.    All opioids tend to cause constipation. Drink plenty of water and eat foods that have a lot of fiber, such as fruits, vegetables, prune juice, apple juice and high-fiber cereal. Take a laxative (Miralax, milk of magnesia, Colace, Senna) if you don t move your bowels at least every other day. Other side effects include upset stomach, sleepiness, dizziness, throwing up, tolerance (needing more of the medicine to have the same effect), physical dependence and slowed breathing.    Store your pills in a secure place, locked if possible. We will not replace any lost or stolen medicine. If you don t finish your medicine, please throw away (dispose) as directed by your pharmacist. The [place] Pollution [employer] has more information about safe disposal: https://www.pca.[facility].[place].us/living-green/managing-unwanted-medications             Medication List: This is a list of all your medications and when to take them. Check marks below indicate your daily home schedule. Keep this list as a reference.      Medications           Morning Afternoon Evening Bedtime As Needed    acetaminophen 325 MG tablet   Commonly known as:  TYLENOL   Take 2 tablets (650 mg) by mouth every 6 hours as needed for mild pain    Last time this was given:  975 mg on 9/12/2018  8:52 AM                                cyclobenzaprine 10 MG tablet   Commonly known as:  FLEXERIL   Take 1 tablet (10 mg) by mouth 3 times daily as needed for muscle spasms                                DULoxetine 60 MG EC capsule   Commonly known as:  CYMBALTA   Take 2 capsules by mouth daily.   Last time this was given:  120 mg on 9/11/2018  9:45 PM                                FLUoxetine 40 MG capsule   Commonly known as:  PROzac   Take 1 capsule by mouth daily.                                FLUTICASONE PROPIONATE (NASAL) NA   Spray 1 spray in nostril as needed                                * gabapentin 400 MG capsule   Commonly known as:  NEURONTIN   Take 1 capsule by mouth 3 times daily   Last time this was given:  700 mg on 9/12/2018 12:03 PM                                * gabapentin 100 MG capsule   Commonly known as:  NEURONTIN   Take 300 mg by mouth 2 times daily   Last time this was given:  700 mg on 9/12/2018 12:03 PM                                hyoscyamine 0.125 MG tablet   Commonly known as:  ANASPAZ/LEVSIN   Take 1-2 tablets (125-250 mcg) by mouth every 4 hours as needed for cramping                                omeprazole 20 MG CR capsule   Commonly known as:  priLOSEC   Take 1 capsule (20 mg) by mouth 2 times daily                                ondansetron 4 MG tablet   Commonly known as:  ZOFRAN   Take 1 tablet (4 mg) by mouth every 4 hours as needed for nausea                                oxyCODONE IR 5 MG tablet   Commonly known as:  ROXICODONE   Take 1 tablet (5 mg) by mouth every 4 hours as needed for moderate pain                                senna-docusate 8.6-50 MG per tablet   Commonly known as:  SENOKOT-S;PERICOLACE   Take 1 tablet by mouth 2 times daily for 14 days   Last time this was given:  1 tablet on 9/12/2018  8:53 AM                                topiramate 25 MG tablet   Commonly known as:  TOPAMAX   Take 3  tablets (75 mg) by mouth 2 times daily   Last time this was given:  75 mg on 9/12/2018  8:50 AM                                * Notice:  This list has 2 medication(s) that are the same as other medications prescribed for you. Read the directions carefully, and ask your doctor or other care provider to review them with you.

## 2018-09-11 NOTE — PROGRESS NOTES
SPIRITUAL HEALTH SERVICES  [facility] ([place] [address]   PRE-SURGERY VISIT    Had pre-surgery visit with pt.  Provided spiritual support, prayer. Pt. was joined by two family members.    Raheel Wilde  Chaplain Resident  Pager [phone]

## 2018-09-11 NOTE — PLAN OF CARE
"/89 (BP Location: Right arm)  Pulse 61  Temp 95.7  F (35.4  C) (Oral)  Resp 17  Ht 1.626 m (5' 4\")  Wt 96.4 kg (212 lb 8.4 oz)  SpO2 93%  BMI 36.48 kg/m2    Pt came up at end of shift from gastric sleeve procedure. 5 lap sites covered with tegaderm marked. PIV running LR at 75. Ice pack given for back pain. Did not request pain medication. Assessment done. Post-op vitals in progress. CAPNO on. On bariatric clear liquid diet. Given popsicle and ice chips and water tolerating without GI distress thus far. Will continue with POC.   "
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
## 2018-09-11 NOTE — PROGRESS NOTES
SURGERY POST-OP CHECK NOTE  09/11/2018 3:52 PM    Patient: Dara Anderson  MRN: [id number]    Subjective  No acute events postoperatively. Pain well controlled. No nausea, vomiting, chest pain, shortness of breath.    Objective  Temp:  [95.7  F (35.4  C)-98.5  F (36.9  C)] 95.7  F (35.4  C)  Pulse:  [61] 61  Heart Rate:  [46-61] 53  Resp:  [10-18] 17  BP: ()/() 174/89  SpO2:  [93 %-100 %] 93 %    General: AAOx4, NAD, lying in bed, appears comfortable  CV: Non cyanotic   Pulm: Breathing comfortably  Abd: soft, appropriately tender to palpation, non-distended, incisions c/d/i   Extremities: warm, well-perfused    Neuro: facial movement grossly symmetric, moving all extremities spontaneously without apparent deficit    Assessment/Plan  Dara Anderson is a 58 year old female POD#0 s/p Procedure(s):  Laparoscopic Sleeve Gastrectomy Latex Free - Wound Class: II-Clean Contaminated, doing well.     Kamille Moulton MD  Surgery PGY1

## 2018-09-11 NOTE — ANESTHESIA POSTPROCEDURE EVALUATION
Patient: Dara Anderson    Procedure(s):  Laparoscopic Sleeve Gastrectomy Latex Free - Wound Class: II-Clean Contaminated    Diagnosis:Morbid Obesity   Diagnosis Additional Information: No value filed.    Anesthesia Type:  General, ETT    Note:  Anesthesia Post Evaluation    Patient location during evaluation: PACU  Patient participation: Able to fully participate in evaluation  Level of consciousness: awake and alert  Pain management: adequate  Airway patency: patent  Cardiovascular status: acceptable  Respiratory status: acceptable  Hydration status: acceptable  PONV: none     Anesthetic complications: None          Last vitals:  Vitals:    09/11/18 1200 09/11/18 1215 09/11/18 1230   BP: 120/54 96/41 93/80   Pulse:      Resp: 10 15 17   Temp:      SpO2: 98% 99% 97%         Electronically Signed By: Van Bedolla MD  September 11, 2018  12:43 PM

## 2018-09-12 ENCOUNTER — APPOINTMENT (OUTPATIENT)
Dept: CARDIOLOGY | Facility: CLINIC | Age: 58
DRG: 621 | End: 2018-09-12
Attending: SURGERY
Payer: MEDICARE

## 2018-09-12 VITALS
SYSTOLIC BLOOD PRESSURE: 149 MMHG | BODY MASS INDEX: 36.28 KG/M2 | TEMPERATURE: 97.9 F | DIASTOLIC BLOOD PRESSURE: 70 MMHG | OXYGEN SATURATION: 98 % | HEART RATE: 64 BPM | WEIGHT: 212.52 LBS | HEIGHT: 64 IN | RESPIRATION RATE: 16 BRPM

## 2018-09-12 LAB
COPATH REPORT: NORMAL
INTERPRETATION ECG - MUSE: NORMAL
INTERPRETATION ECG - MUSE: NORMAL
TROPONIN I SERPL-MCNC: <0.015 UG/L (ref 0–0.04)

## 2018-09-12 PROCEDURE — 25000128 H RX IP 250 OP 636: Performed by: SURGERY

## 2018-09-12 PROCEDURE — A9270 NON-COVERED ITEM OR SERVICE: HCPCS | Mod: GY | Performed by: STUDENT IN AN ORGANIZED HEALTH CARE EDUCATION/TRAINING PROGRAM

## 2018-09-12 PROCEDURE — 36415 COLL VENOUS BLD VENIPUNCTURE: CPT | Performed by: INTERNAL MEDICINE

## 2018-09-12 PROCEDURE — 25000132 ZZH RX MED GY IP 250 OP 250 PS 637: Mod: GY | Performed by: STUDENT IN AN ORGANIZED HEALTH CARE EDUCATION/TRAINING PROGRAM

## 2018-09-12 PROCEDURE — A9270 NON-COVERED ITEM OR SERVICE: HCPCS | Mod: GY | Performed by: SURGERY

## 2018-09-12 PROCEDURE — 25500064 ZZH RX 255 OP 636: Performed by: INTERNAL MEDICINE

## 2018-09-12 PROCEDURE — 84484 ASSAY OF TROPONIN QUANT: CPT | Performed by: INTERNAL MEDICINE

## 2018-09-12 PROCEDURE — 93306 TTE W/DOPPLER COMPLETE: CPT | Mod: 26 | Performed by: INTERNAL MEDICINE

## 2018-09-12 PROCEDURE — 25000132 ZZH RX MED GY IP 250 OP 250 PS 637: Mod: GY | Performed by: SURGERY

## 2018-09-12 RX ADMIN — KETOROLAC TROMETHAMINE 30 MG: 30 INJECTION, SOLUTION INTRAMUSCULAR at 03:37

## 2018-09-12 RX ADMIN — TOPIRAMATE 75 MG: 50 TABLET ORAL at 08:50

## 2018-09-12 RX ADMIN — HUMAN ALBUMIN MICROSPHERES AND PERFLUTREN 6 ML: 10; .22 INJECTION, SOLUTION INTRAVENOUS at 10:30

## 2018-09-12 RX ADMIN — GABAPENTIN 700 MG: 400 CAPSULE ORAL at 12:03

## 2018-09-12 RX ADMIN — SENNOSIDES AND DOCUSATE SODIUM 1 TABLET: 8.6; 5 TABLET ORAL at 08:53

## 2018-09-12 RX ADMIN — GABAPENTIN 700 MG: 400 CAPSULE ORAL at 08:52

## 2018-09-12 RX ADMIN — ACETAMINOPHEN 975 MG: 325 TABLET, FILM COATED ORAL at 08:52

## 2018-09-12 RX ADMIN — KETOROLAC TROMETHAMINE 30 MG: 30 INJECTION, SOLUTION INTRAMUSCULAR at 11:23

## 2018-09-12 RX ADMIN — ENOXAPARIN SODIUM 40 MG: 40 INJECTION SUBCUTANEOUS at 08:49

## 2018-09-12 RX ADMIN — FLUOXETINE 40 MG: 10 TABLET, FILM COATED ORAL at 08:51

## 2018-09-12 ASSESSMENT — ACTIVITIES OF DAILY LIVING (ADL)
ADLS_ACUITY_SCORE: 9

## 2018-09-12 NOTE — PLAN OF CARE
"Problem: Patient Care Overview  Goal: Plan of Care/Patient Progress Review  /70 (BP Location: Right arm)  Pulse 64  Temp 97.9  F (36.6  C) (Oral)  Resp 16  Ht 1.626 m (5' 4\")  Wt 96.4 kg (212 lb 8.4 oz)  SpO2 98%  BMI 36.48 kg/m2    Pt afebrile. PIV removed. Voiding independently and not retaining. Passing flatus but no BM for shift. 5 lap sites with no change in drainage amount. SBA. Tolerating bariatric full liquid diet without any complaints of GI distress. Educated on discharge instructions. No further questions at this time. Plan to discharge home.      "

## 2018-09-12 NOTE — PROGRESS NOTES
"SPIRITUAL HEALTH SERVICES  SPIRITUAL ASSESSMENT Progress Note  [facility]B     REFERRAL SOURCE: Follow up from pre-surgical visit on 9/11    Met with pt Dara and offered emotional support and prayer. Dara said, \"I am going home tonight\" with great janiya. She expressed her mom as her immediate support after she gets home. 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 She expressed that spirituality is important to her and appreciated the 's visit.     PLAN: No follow up required.    Michela Hernandez  Chaplain Resident  Pager  [phone]    "

## 2018-09-12 NOTE — PLAN OF CARE
"Problem: Patient Care Overview  Goal: Plan of Care/Patient Progress Review  Outcome: No Change  /57 (BP Location: Left arm)  Pulse 61  Temp 97.7  F (36.5  C) (Oral)  Resp 15  Ht 1.626 m (5' 4\")  Wt 96.4 kg (212 lb 8.4 oz)  SpO2 98%  BMI 36.48 kg/m2    7837-8207: 9/11 Gastric sleeve for morbid obesity  A&Ox4. Afebrile. VSS w/exception of bradycardia; HR 50s. 12 lead EKG ordered; sinus bradycardia. Troponin checks Q6H from chest pain earlier today (Trops neg x2). Weaned off O2 until HS when she started to dose. Pt has known sleep apnea. 2L O2 given via home CPAP. Capnography discontinued with CPAP use. IPI has been 9-10. Pt's abdominal pain is comfortably manageable on scheduled Tylenol and Toradol. Pt noticed relief from Toradol and BP decreased. Early in shift pt had BP of 181/93; MD contacted and hydralazine was ordered. Did not need to administer hydralazine; BP improved. LR infusing at 75 ml/hr. Bariatric full liquids ordered; ate orange jello and apple juice for dinner. Voided x1; 400 ml +. No flatus yet. On scheduled stool softeners (senokot). Up with SBA. Counted 4 lap sites with primapores; drainage marked on dressing. Continue to monitor and follow POC.        "

## 2018-09-12 NOTE — DISCHARGE SUMMARY
"[facility] Discharge Summary    Date of Admission: 9/11/2018  Date of Discharge: 9/12/2018    Admission Diagnosis:  1. Morbid Obesity    Discharge Diagnosis:  1. Same as above    Consultations:  Cardiology     Procedures:  1. Laparoscopic Sleeve Gastrectomy on 9/11/2018 by Dr Cindi Hastings HPI:  Per chart review: Dara Anderson is a 58 year old female who is morbidly obese.  Numerous weight loss attempts without surgery have been without success.     Hospital Course:  The patient was admitted and underwent the above procedure. The patient tolerated the procedure well. There were no complications. Postoperatively in PACU, patient had chest pain in which cardiology was consulted. Trop and EKG negative, pain likely post procedural,  and no further cardiac workup was needed during this hospitalization. The patient's diet was slowly advanced as bowel function returned. Pain was controlled with oral pain medication and the patient was able to ambulate and void without difficulty. The patient received appropriate education post operatively. On POD #1 the patient was discharged to home.    Discharge Physical Exam:  /70 (BP Location: Right arm)  Pulse 64  Temp 97.9  F (36.6  C) (Oral)  Resp 16  Ht 1.626 m (5' 4\")  Wt 96.4 kg (212 lb 8.4 oz)  SpO2 98%  BMI 36.48 kg/m2    Gen: NAD  Lungs: non-labored breathing  CV: Non cyanotic  Abd: obese, soft, nondistended, nontender, incisions are c/d/i  Staples removed from incisions, dressed with steri strips and primipore   Ext: no peripheral edema  Neuro: AOx3    Meds:     Review of your medicines      START taking       Dose / Directions    acetaminophen 325 MG tablet   Commonly known as:  TYLENOL        Dose:  650 mg   Take 2 tablets (650 mg) by mouth every 6 hours as needed for mild pain   Quantity:  100 tablet   Refills:  0       hyoscyamine 0.125 MG tablet   Commonly known as:  ANASPAZ/LEVSIN        Dose:  0.125-0.25 mg "   Take 1-2 tablets (125-250 mcg) by mouth every 4 hours as needed for cramping   Quantity:  60 tablet   Refills:  0       omeprazole 20 MG CR capsule   Commonly known as:  priLOSEC        Dose:  20 mg   Take 1 capsule (20 mg) by mouth 2 times daily   Quantity:  60 capsule   Refills:  1       ondansetron 4 MG tablet   Commonly known as:  ZOFRAN        Dose:  4 mg   Take 1 tablet (4 mg) by mouth every 4 hours as needed for nausea   Quantity:  30 tablet   Refills:  0       oxyCODONE IR 5 MG tablet   Commonly known as:  ROXICODONE        Dose:  5 mg   Take 1 tablet (5 mg) by mouth every 4 hours as needed for moderate pain   Quantity:  20 tablet   Refills:  0       senna-docusate 8.6-50 MG per tablet   Commonly known as:  SENOKOT-S;PERICOLACE        Dose:  1 tablet   Take 1 tablet by mouth 2 times daily for 14 days   Quantity:  28 tablet   Refills:  0         CONTINUE these medicines which may have CHANGED, or have new prescriptions. If we are uncertain of the size of tablets/capsules you have at home, strength may be listed as something that might have changed.       Dose / Directions    cyclobenzaprine 10 MG tablet   Commonly known as:  FLEXERIL   This may have changed:  when to take this   Used for:  Other symptoms referable to back        Dose:  10 mg   Take 1 tablet (10 mg) by mouth 3 times daily as needed for muscle spasms   Quantity:  90 tablet   Refills:  5       DULoxetine 60 MG EC capsule   Commonly known as:  CYMBALTA   This may have changed:  when to take this        Dose:  120 mg   Take 2 capsules by mouth daily.   Quantity:  180 capsule   Refills:  3       FLUoxetine 40 MG capsule   Commonly known as:  PROzac   This may have changed:  when to take this   Used for:  Depressive disorder, not elsewhere classified        Dose:  40 mg   Take 1 capsule by mouth daily.   Quantity:  20 capsule   Refills:  0         CONTINUE these medicines which have NOT CHANGED       Dose / Directions    FLUTICASONE PROPIONATE  (NASAL) NA        Dose:  1 spray   Spray 1 spray in nostril as needed   Refills:  0       * gabapentin 400 MG capsule   Commonly known as:  NEURONTIN        Dose:  1 capsule   Take 1 capsule by mouth 3 times daily   Refills:  0       * gabapentin 100 MG capsule   Commonly known as:  NEURONTIN        Dose:  300 mg   Take 300 mg by mouth 2 times daily   Refills:  0       topiramate 25 MG tablet   Commonly known as:  TOPAMAX   Used for:  Morbid obesity (H)        Dose:  75 mg   Take 3 tablets (75 mg) by mouth 2 times daily   Quantity:  180 tablet   Refills:  3       * Notice:  This list has 2 medication(s) that are the same as other medications prescribed for you. Read the directions carefully, and ask your doctor or other care provider to review them with you.      STOP taking          acetaminophen-codeine 300-30 MG ED starter pack   Commonly known as:  TYLENOL #3           ALEVE PO           calcium carbonate 600 MG tablet   Generic drug:  calcium carbonate           FISH OIL PO           MULTIVITAMIN PO           pravastatin 40 MG tablet   Commonly known as:  PRAVACHOL                Where to get your medicines      These medications were sent to [facility] - [place], [place] - [address] [address]     Phone:  [phone]      acetaminophen 325 MG tablet     hyoscyamine 0.125 MG tablet     omeprazole 20 MG CR capsule     ondansetron 4 MG tablet     senna-docusate 8.6-50 MG per tablet         Some of these will need a paper prescription and others can be bought over the counter. Ask your nurse if you have questions.     Bring a paper prescription for each of these medications      oxyCODONE IR 5 MG tablet             Additional instructions:  After Care     Future Labs/Procedures    Discharge Instructions     Comments:    Follow up  Follow-up with your surgeon team (Dr. Puente) in 1-2 weeks. This appointment was previously made for you and is scheduled as  below. If you have any questions about appointment scheduling, please call [phone] and choose option #1.    You will receive a call from our clinic nurse after surgery.  If you have any questions and would like to speak with a nurse please call [phone] and choose option #3 to speak with a triage nurse.    Appointments located at [facility]:  [address]  [place] [address]    Diet  If surgery was with Dr Puente: post-op diet is bariatric clear liquids for 1 DAY and then advance to full liquids two days after surgery.      Activity  - No lifting >10 pounds for 4-6 weeks. Discuss with your surgeon at follow up appointment.  - May shower starting postoperative day #1 but no scrubbing incisions. No bathing or soaking incisions for 2 weeks or until incisions completely healed.  - No driving for at least 12 hours after taking narcotic pain medication.    Wound Care  Keep clean and dry. Steri strips or glue will fall off on their own.    When to call  Call [phone] and choose option #3 to speak with a triage nurse if you are having troubles during regular business hours.  Call [phone] and ask to speak with surgery resident if you are having troubles in the evenings, at night, or on weekends.   Please call if you experience increasing abdominal pain, nausea, vomiting, increasing drainage from your wounds, chills, or fever >101.5    Medications  After surgery it is ok to swallow medications smaller than 1/4 inch (size of pencil eraser) for all procedures.  If medication is larger than 1/4 inch then it will need to be crushed, cut or in liquid form for 1-2 months after surgery. This can be discussed with surgeon team at the 1 month follow up appointment and will depend on patient tolerance.    If you still have your gallbladder you will be given a prescription called Actigall or Ursodiol in a capsule form to prevent gallstones during rapid weight loss (this will be addressed  at your first follow up appointment in 1-2 weeks).  This capsule can be opened and put into your liquids or food (when your diet progresses). You will need to take this medications for 6 months after surgery.    You should stop taking your oral diabetes medications until instructed by a doctor to restart (because you will have a greater risk of low blood sugar after your surgery).    You should stop taking any statins for high cholesterol or any vitamins/minerals until instructed by a doctor to restart.    You should not take any NSAIDs (ex: aspirin, ibuprofen, etc.) until cleared by your surgeon.    It is recommended you take over the counter Tylenol in addition to oxycodone (narcotic medication) for pain; see discharge medications for suggestion for Tylenol as this helps with overall pain control. Once you are no longer needing oxycodone for pain control, you can decrease the Tylenol dosage and frequency and then stop taking it. Do not take more than 4,000 mg of acetaminophen (Tylenol) from all sources to prevent liver damage.     It is recommended that you take stool softener while taking narcotic pain medication to prevent constipation (senna-docusate works well, or any other over-the-counter medication you would like for stool softening).              Follow Up:  -Follow up with Dr Puente in clinic in 1 week(s) after discharge. You should be called to make an appointment within 3 business days. If you are not contacted, call [phone] to make an appointment.

## 2018-09-12 NOTE — PLAN OF CARE
"Problem: Bariatric Surgery (Adult,Pediatric)  Goal: Signs and Symptoms of Listed Potential Problems Will be Absent, Minimized or Managed (Bariatric Surgery)  Signs and symptoms of listed potential problems will be absent, minimized or managed by discharge/transition of care (reference Bariatric Surgery (Adult,Pediatric) CPG).  Outcome: No Change  AVSS.  O2 sats 97/98% on RA/CPAP.  Abdomen soft/tender.  Pt tolerating clears overnight.  Abdominal lap site dressings dry/intact.  Voiding spont (adequate amounts).  Pt taking scheduled IV toradol for pain with \"adequate\" pain relief.  Denied need for any other pain medications.        "

## 2018-09-15 ENCOUNTER — CARE COORDINATION (OUTPATIENT)
Dept: SURGERY | Facility: CLINIC | Age: 58
End: 2018-09-15

## 2018-09-15 NOTE — LETTER
September 15, 2018      TO: Dara Anderson  [address] [address]         Dear Ms. Dara KNOX Anderson,    We have attempted to contact you regarding follow up after our hospital stay.    Your post op appointment is scheduled for:    Provider: Dr. Puente  Date: 9/20  Time: 112    We request that you please check in 10-15 minutes prior to your appointment    We are located at:    [facility]  [address]  [place] [address]    Follow-up is important after surgery, please call if you have any questions or concerns during clinic hours 8:00a-4:30p:     For General Surgery: [phone] or Bariatric Surgery: [phone]     Please call the hospital at [phone] if you have any urgent concerns, or are calling after hours.          Sincerely,      Maria C Quiroz LPN

## 2018-09-18 ENCOUNTER — CARE COORDINATION (OUTPATIENT)
Dept: SURGERY | Facility: CLINIC | Age: 58
End: 2018-09-18

## 2018-09-20 ENCOUNTER — OFFICE VISIT (OUTPATIENT)
Dept: SURGERY | Facility: CLINIC | Age: 58
End: 2018-09-20
Payer: COMMERCIAL

## 2018-09-20 VITALS
WEIGHT: 202.2 LBS | HEART RATE: 73 BPM | TEMPERATURE: 98.1 F | DIASTOLIC BLOOD PRESSURE: 77 MMHG | OXYGEN SATURATION: 97 % | SYSTOLIC BLOOD PRESSURE: 116 MMHG | BODY MASS INDEX: 34.52 KG/M2 | HEIGHT: 64 IN

## 2018-09-20 DIAGNOSIS — K31.83 ACHLORHYDRIA, GASTRIC: Primary | ICD-10-CM

## 2018-09-20 ASSESSMENT — PAIN SCALES - GENERAL: PAINLEVEL: NO PAIN (0)

## 2018-09-20 NOTE — LETTER
9/20/2018       RE: Dara Anderson  [address] [address]     Dear Colleague,    Thank you for referring your patient, Dara Anderson, to the [facility] SURGICAL [facility]IGHT MANAGEMENT at [facility]. Please see a copy of my visit note below.    Nutrition Assessment  Reason For Visit:  Dara Anderson is a 58 year old female presenting today for nutrition follow-up, 1 week s/p SG with Dr Puente on 9/11/18.  Patient referred by Dr Puente(9/20/18).    Anthropometrics  Initial Consult Weight: 255.3 lbs  Day of Surgery Weight: 212.5 lbs  Current Weight: 202.2 lbs  Weight loss: -53.1 lbs from initial consult; -10.3 lbs from day of surgery    Current Vitamins/Minerals: none    Nutrition History  Pt reports consuming and tolerating bariatric clear and low-fat full liquid diets. Fluid intake appears adequate, consuming 48-64 oz/day.    Nutrition Prescription:  Grams Protein: 60 (minimum)  Amount of Fluid: 48-64 oz    Nutrition Diagnosis  Food and nutrition-related knowledge deficit r/t lack of prior exposure to diet advancements beyond bariatric low-fat full liquid diet aeb pt unable to verbalize understanding of bariatric pureed and soft diets.    Intervention  Intervention At Appointment:  Materials/education provided on bariatric pureed and soft diets, protein intake, fluid intake, eating pace, portion control, avoiding excess sugar and fat, recommended vitamin/mineral supplements.    Patient Understanding: good  Expected Compliance: good    Goals:  1) Follow bariatric low-fat full liquid diet through day 13 post-op, then to progress to pureed diet x 2 weeks(9/25).  If tolerating, may advance on day 29 post-op to bariatric soft diet(10/9).   2) Work towards 60 gm protein/day.  3) Consume 48-64 oz fluids daily- between meals.  4) Eat slowly (>20 min/meal), chewing well to smooth consistency once on the bariatric soft diet.  5) Limit portions to 1/2 cup/meal.  6)  Start chewable/liquid multivitamin/minerals twice daily.    Follow-Up: 3 weeks or prn    Time spent with patient: 15 minutes.  Vicki Crawford RD, LD

## 2018-09-20 NOTE — PATIENT INSTRUCTIONS
Goals:  1) Follow bariatric low-fat full liquid diet through day 13 post-op, then to progress to pureed diet x 2 weeks(9/25).  If tolerating, may advance on day 29 post-op to bariatric soft diet(10/9).   2) Work towards 60 gm protein/day.  3) Consume 48-64 oz fluids daily- between meals.  4) Eat slowly (>20 min/meal), chewing well to smooth consistency once on the bariatric soft diet.  5) Limit portions to 1/2 cup/meal.  6) Start chewable/liquid multivitamin/minerals twice daily.    Follow up with RD in one month    Vicki Crawford RD, LD  If you need to schedule or reschedule with a dietitian please call [phone].

## 2018-09-20 NOTE — LETTER
"9/20/2018       RE: Dara Anderson  [address] [facility] [address]     Dear Colleague,    Thank you for referring your patient, Dara Anderson, to the [facility] SURGICAL WEIGHT MANAGEMENT at [facility]. Please see a copy of my visit note below.    Patient underwent prior sleeve gastrectomy surgery and this occurred 1 weeks ago.    Dara Anderson overall has the following complaints: none currently; feels great.    On exam:  /77  Pulse 73  Temp 98.1  F (36.7  C) (Oral)  Ht 5' 3.98\"  Wt 202 lb 3.2 oz  SpO2 97%  BMI 34.73 kg/m2  Lung exam: breathing unlabored  Abdominal exam: soft; nontender; nondistended; incisions c/d/i    Plan:  F/u 4 weeks.    ADAT per RD.    Start vitamins    Increase activity level.    No orders of the defined types were placed in this encounter.    Sincerely,    Jhonny Puente MD      "

## 2018-09-20 NOTE — NURSING NOTE
"(   Chief Complaint   Patient presents with     Surgical Followup     1 wk post op LSG 9/11/18    )    ( Weight: 202 lb 3.2 oz )  ( Height: 5' 3.98\" )  ( BMI (Calculated): 34.8 )  ( Initial Weight: 255 lb 6.4 oz )  ( Cumulative weight loss (lbs): 53.2 )  ( Last Visits Weight: 223 lb 4.8 oz )  ( Wt change since last visit (lbs): -21.1 )  (   )  (   )    ( BP: 116/77 )  (   )  ( Temp: 98.1  F (36.7  C) )  ( Temp src: Oral )  ( Pulse: 73 )  (   )  ( SpO2: 97 % )    (   Patient Active Problem List   Diagnosis     Degeneration of lumbar or lumbosacral intervertebral disc     Sprain of thoracic region     Myalgia and myositis     Nonspecific reaction to tuberculin skin test without active tuberculosis     Other symptoms referable to back     Hyperlipidemia     Premature menopause     Pseudogout     Adjustment disorder with mixed anxiety and depressed mood     Major depressive disorder, recurrent episode, moderate (H)     S/P total knee replacement     LUCIANO (obstructive sleep apnea)     Morbid (severe) obesity due to excess calories (H)    )  (   Current Outpatient Prescriptions   Medication Sig Dispense Refill     acetaminophen (TYLENOL) 325 MG tablet Take 2 tablets (650 mg) by mouth every 6 hours as needed for mild pain 100 tablet 0     cyclobenzaprine (FLEXERIL) 10 MG tablet Take 1 tablet (10 mg) by mouth 3 times daily as needed for muscle spasms (Patient taking differently: Take 10 mg by mouth as needed for muscle spasms ) 90 tablet 5     DULoxetine (CYMBALTA) 60 MG capsule Take 2 capsules by mouth daily. (Patient taking differently: Take 120 mg by mouth At Bedtime ) 180 capsule 3     FLUoxetine (PROZAC) 40 MG capsule Take 1 capsule by mouth daily. (Patient taking differently: Take 40 mg by mouth every morning ) 20 capsule 0     gabapentin (NEURONTIN) 100 MG capsule Take 300 mg by mouth 2 times daily        gabapentin (NEURONTIN) 400 MG capsule Take 1 capsule by mouth 3 times daily        omeprazole (PRILOSEC) 20 MG CR " capsule Take 1 capsule (20 mg) by mouth 2 times daily 60 capsule 1     FLUTICASONE PROPIONATE, NASAL, NA Spray 1 spray in nostril as needed        hyoscyamine (ANASPAZ/LEVSIN) 0.125 MG tablet Take 1-2 tablets (125-250 mcg) by mouth every 4 hours as needed for cramping (Patient not taking: Reported on 9/20/2018) 60 tablet 0     ondansetron (ZOFRAN) 4 MG tablet Take 1 tablet (4 mg) by mouth every 4 hours as needed for nausea (Patient not taking: Reported on 9/20/2018) 30 tablet 0     topiramate (TOPAMAX) 25 MG tablet Take 3 tablets (75 mg) by mouth 2 times daily (Patient not taking: Reported on 9/20/2018) 180 tablet 3    )  ( Diabetes Eval:    )    ( Pain Eval:  No Pain (0) )    ( Wound Eval:       )    (   History   Smoking Status     Former Smoker     Quit date: 1/1/1980   Smokeless Tobacco     Never Used     Comment: Quit in the l980's-smoked about three years    )    ( Signed By:  David Odell; September 20, 2018; 11:09 AM )

## 2018-09-20 NOTE — MR AVS SNAPSHOT
MRN:[id number]                      After Visit Summary   9/20/2018    Dara Anderson    MRN: [id number]           Visit Information        Provider Department      9/20/2018 12:00 PM Vicki Crawford RD M [facility] Surgical Weight Management        Your next 10 appointments already scheduled     Oct 18, 2018  8:45 AM CDT   (Arrive by 8:30 AM)   Bariatric Post Op Global Visit with JESSE Russell [facility] Surgical Weight Management ([facility])    [address] [place] [id number] [phone]            Oct 18, 2018  9:00 AM CDT   (Arrive by 8:45 AM)   Return Bariatric Nutrition Visit with URBANO Brown [facility] Surgical Weight Management ([facility])    [address] [place] [id number] [phone]              Care Instructions    Goals:  1) Follow bariatric low-fat full liquid diet through day 13 post-op, then to progress to pureed diet x 2 weeks(9/25).  If tolerating, may advance on day 29 post-op to bariatric soft diet(10/9).   2) Work towards 60 gm protein/day.  3) Consume 48-64 oz fluids daily- between meals.  4) Eat slowly (>20 min/meal), chewing well to smooth consistency once on the bariatric soft diet.  5) Limit portions to 1/2 cup/meal.  6) Start chewable/liquid multivitamin/minerals twice daily.    Follow up with RD in one month    Vicki Crawford RD, LD  If you need to schedule or reschedule with a dietitian please call [phone].          Newsc[employer] Information     [employer] gives you secure access to your electronic health record. If you see a primary care provider, you can also send messages to your care team and make appointments. If you have questions, please call your primary care clinic.  If you do not have a primary care provider, please call [phone] and they will assist you.      New[employer] is an electronic gateway that provides easy, online  access to your medical records. With [employer], you can request a clinic appointment, read your test results, renew a prescription or communicate with your care team.     To access your existing account, please contact your [facility] or call [phone] for assistance.        Care EveryWhere ID     This is your Care EveryWhere ID. This could be used by other organizations to access your [place] medical records  [id number]        Equal Access to Services     GUILLAUME BOSCH : Florentino Curtis, erica rajput, ximena arellano, candie cook. So [facility] [phone].    ATENCIÓN: Si habla español, tiene a coronel disposición servicios gratuitos de asistencia lingüística. Llame al [phone].    We comply with applicable federal civil rights laws and [place] laws. We do not discriminate on the basis of race, color, national origin, age, disability, sex, sexual orientation, or gender identity.

## 2018-09-21 NOTE — PROGRESS NOTES
"Patient underwent prior sleeve gastrectomy surgery and this occurred 1 weeks ago.    Dara Anderson overall has the following complaints: none currently; feels great.    On exam:  /77  Pulse 73  Temp 98.1  F (36.7  C) (Oral)  Ht 5' 3.98\"  Wt 202 lb 3.2 oz  SpO2 97%  BMI 34.73 kg/m2  Lung exam: breathing unlabored  Abdominal exam: soft; nontender; nondistended; incisions c/d/i    Plan:  F/u 4 weeks.    ADAT per RD.    Start vitamins    Increase activity level.    No orders of the defined types were placed in this encounter.          Jhonny Puente MD  Surgery  [phone] (hospital )  [phone] (clinic nurses)                "

## 2018-10-18 ENCOUNTER — ALLIED HEALTH/NURSE VISIT (OUTPATIENT)
Dept: SURGERY | Facility: CLINIC | Age: 58
End: 2018-10-18
Payer: COMMERCIAL

## 2018-10-18 ENCOUNTER — OFFICE VISIT (OUTPATIENT)
Dept: SURGERY | Facility: CLINIC | Age: 58
End: 2018-10-18
Payer: COMMERCIAL

## 2018-10-18 VITALS
BODY MASS INDEX: 33.2 KG/M2 | HEART RATE: 60 BPM | WEIGHT: 193.3 LBS | OXYGEN SATURATION: 96 % | TEMPERATURE: 98.3 F | SYSTOLIC BLOOD PRESSURE: 130 MMHG | DIASTOLIC BLOOD PRESSURE: 86 MMHG | RESPIRATION RATE: 14 BRPM

## 2018-10-18 DIAGNOSIS — Z98.84 STATUS POST BARIATRIC SURGERY: Primary | ICD-10-CM

## 2018-10-18 DIAGNOSIS — E66.01 MORBID (SEVERE) OBESITY DUE TO EXCESS CALORIES (H): ICD-10-CM

## 2018-10-18 RX ORDER — URSODIOL 300 MG/1
300 CAPSULE ORAL 2 TIMES DAILY
Qty: 60 CAPSULE | Refills: 4 | Status: SHIPPED | OUTPATIENT
Start: 2018-10-18 | End: 2018-12-20

## 2018-10-18 NOTE — PROGRESS NOTES
"  [facility]: Nurse (RN) Visit Note  SITUATION/BACKGROUND                                                      Dara Anderson is a 58 year old female, who presents to clinic for Postoperative bariatric surgery visit.    Patient underwent sleeve gastrectomy 4 weeks ago.      She states she feels great and has no concerns today. She is excited because she has \"never felt full in my life\" and now, \"I'm not hungry\".     Attends bariatric support group in [place], where she lives, which has also been helpful.     Tolerating liquids: yes, tolerating regular diet, has been being mindful of fullness during meals, no episodes of getting too full or dysphagia   Lightheadedness: no  Abdominal pain: no  Bowel movements: regular, no constipation  Fevers/shakes/chills: no  GERD: no, has been taking omeprazole twice daily per post op order, no history of reflux pre surgery    ASSESSMENT      /86 (BP Location: Left arm, Patient Position: Sitting, Cuff Size: Adult Regular)  Pulse 60  Temp 98.3  F (36.8  C) (Oral)  Resp 14  Wt 193 lb 4.8 oz  SpO2 96%  BMI 33.2 kg/m2     NAD  Overall looks well  Incisions c/d/i; abdomen soft non-tender      RECOMMENDATION/PLAN                                                      Plan:  1. RD visit today.  2. Start vitamin supplements per RD directions.  3. Advance diet per RD directions.  4. Decrease omeprazole to once daily, refill sent  5. Follow-up: 8 weeks   6. Actigall prescription sent   7. B12 SL   8. Compare prices with bariatric advantage     Today's visit was:  Ordered or requested by: DAVID Rodrigez. Supervising provider: DAVID Rodrigez who was in clinic and available if needed.       Gissel David RN on 10/18/2018 at 8:30 AM     "

## 2018-10-18 NOTE — MR AVS SNAPSHOT
MRN:[id number]                      After Visit Summary   10/18/2018    Dara Anderson    MRN: [id number]           Visit Information        Provider Department      10/18/2018 9:00 AM Vicki Crawford RD M [facility] Surgical Weight Management        Your next 10 appointments already scheduled     Oct 18, 2018  9:00 AM CDT   (Arrive by 8:45 AM)   Return Bariatric Nutrition Visit with URBANO Brown [facility] Surgical Weight Management ([facility])    [address] [place] [id number] [phone]              Care Instructions    Goals:  1) Follow soft diet for 4 weeks, then to progress to bariatric regular diet(11/6).   2) Consume 60 grams of protein/day.  3) Sip on 48-64 oz of fluids/day- between meals only.  4) Eat slowly (>20 min/meal), chewing foods well (to applesauce-like consistency).  5) Limit portions to 1/2 cup/meal.  6) Take the following supplements:    Multivitamin/minerals: adult dose 2 times daily    Iron: 45-60 mg elemental (18-36 mg if low risk) - may partly or fully be covered in multivitamin     Calcium Citrate containing vitamin D: 500 mg 3 times daily or 600 mg 2 times daily    Vitamin B12: sublingual form of at least 500 mcg daily or injection of 1000 mcg monthly     B-50 Complex once daily  7) Continue walking at least 1/2 mile per day.    Follow up with RD in two month    Vicki Crawford RD, LD  If you need to schedule or reschedule with a dietitian please call [phone].          [employer] Information     [employer] gives you secure access to your electronic health record. If you see a primary care provider, you can also send messages to your care team and make appointments. If you have questions, please call your primary care clinic.  If you do not have a primary care provider, please call [phone] and they will assist you.      [employer] Cloud is an electronic gateway that provides easy, online access to your  medical records. With [employer], you can request a clinic appointment, read your test results, renew a prescription or communicate with your care team.     To access your existing account, please contact your [facility] or call [phone] for assistance.        Care EveryWhere ID     This is your Care EveryWhere ID. This could be used by other organizations to access your [place] medical records  [id number]        Equal Access to Services     GUILLAUME BOSCH : Florentino Curtis, wamaría rajput, ximena piedraalmaleonardo arellano, candie cook. So [facility] [phone].    ATENCIÓN: Si habla alisa, tiene a coronel disposición servicios gratuitos de asistencia lingüística. Llame al [phone].    We comply with applicable federal civil rights laws and [place] laws. We do not discriminate on the basis of race, color, national origin, age, disability, sex, sexual orientation, or gender identity.

## 2018-10-18 NOTE — MR AVS SNAPSHOT
After Visit Summary   10/18/2018    Dara Anderson    MRN: [id number]           Patient Information     Date Of Birth          1960        Visit Information        Provider Department      10/18/2018 8:30 AM Nurse,  Surgery General Surgery        Today's Diagnoses     Status post bariatric surgery    -  1    Morbid (severe) obesity due to excess calories (H)          Care Instructions      Plan:  1. RD visit today.  2. Start vitamin supplements per RD directions.  3. Advance diet per RD directions.  4. Decrease omeprazole to once daily, refill sent  5. Follow-up: 8 weeks with Alyssa and dietitian   6. Actigall prescription sent   7. B12 SL   8. Compare prices with bariatric advantage           Follow-ups after your visit        Your next 10 appointments already scheduled     Oct 18, 2018  8:30 AM CDT   Nurse Visit with  Surgery Nurse   General Surgery ([facility] [place])    [address] [place] [id number] [phone]            Oct 18, 2018  9:00 AM CDT   (Arrive by 8:45 AM)   Return Bariatric Nutrition Visit with Vicki Crawford RD   [facility] Surgical Weight Management ([facility] [place])    [address] [place] [id number] [phone]              Who to contact     Please call your clinic at [phone] to:    Ask questions about your health    Make or cancel appointments    Discuss your medicines    Learn about your test results    Speak to your doctor            Additional Information About Your Visit        MyChart Information     App[employer] gives you secure access to your electronic health record. If you see a primary care provider, you can also send messages to your care team and make appointments. If you have questions, please call your primary care clinic.  If you do not have a primary care provider, please call [phone] and they will assist you.      [employer] is an electronic  gateway that provides easy, online access to your medical records. With [employer], you can request a clinic appointment, read your test results, renew a prescription or communicate with your care team.     To access your existing account, please contact your [facility] or call [phone] for assistance.        Care EveryWhere ID     This is your Care EveryWhere ID. This could be used by other organizations to access your [place] medical records  [id number]        Your Vitals Were     Pulse Temperature Respirations Pulse Oximetry BMI (Body Mass Index)       60 98.3  F (36.8  C) (Oral) 14 96% 33.2 kg/m2        Blood Pressure from Last 3 Encounters:   10/18/18 130/86   09/20/18 116/77   09/12/18 149/70    Weight from Last 3 Encounters:   10/18/18 193 lb 4.8 oz   09/20/18 202 lb 3.2 oz   09/11/18 212 lb 8.4 oz              Today, you had the following     No orders found for display         Today's Medication Changes          These changes are accurate as of 10/18/18  8:24 AM.  If you have any questions, ask your nurse or doctor.               These medicines have changed or have updated prescriptions.        Dose/Directions    cyclobenzaprine 10 MG tablet   Commonly known as:  FLEXERIL   This may have changed:  when to take this   Used for:  Other symptoms referable to back        Dose:  10 mg   Take 1 tablet (10 mg) by mouth 3 times daily as needed for muscle spasms   Quantity:  90 tablet   Refills:  5       DULoxetine 60 MG EC capsule   Commonly known as:  CYMBALTA   This may have changed:  when to take this        Dose:  120 mg   Take 2 capsules by mouth daily.   Quantity:  180 capsule   Refills:  3       FLUoxetine 40 MG capsule   Commonly known as:  PROzac   This may have changed:  when to take this   Used for:  Depressive disorder, not elsewhere classified        Dose:  40 mg   Take 1 capsule by mouth daily.   Quantity:  20 capsule   Refills:  0                Primary Care  Provider Office Phone # Fax #    Letitia Vu [phone]       [facility] [place] [address]  [place] [address]        Equal Access to Services     GUILLAUME BOSCH : Hadii daniel ku ashleyo Soomaali, waaxda luqadaha, qaybta kaalmada adeegyada, candie sheikhmarquis cook. So [facility] [phone].    ATENCIÓN: Si habla español, tiene a coronel disposición servicios gratuitos de asistencia lingüística. Llame al [phone].    We comply with applicable federal civil rights laws and [place] laws. We do not discriminate on the basis of race, color, national origin, age, disability, sex, sexual orientation, or gender identity.            Thank you!     Thank you for choosing GENERAL SURGERY  for your care. Our goal is always to provide you with excellent care. Hearing back from our patients is one way we can continue to improve our services. Please take a few minutes to complete the written survey that you may receive in the mail after your visit with us. Thank you!             Your Updated Medication List - Protect others around you: Learn how to safely use, store and throw away your medicines at www.disposemymeds.org.          This list is accurate as of 10/18/18  8:24 AM.  Always use your most recent med list.                   Brand Name Dispense Instructions for use Diagnosis    acetaminophen 325 MG tablet    TYLENOL    100 tablet    Take 2 tablets (650 mg) by mouth every 6 hours as needed for mild pain    Morbid (severe) obesity due to excess calories (H)       cyclobenzaprine 10 MG tablet    FLEXERIL    90 tablet    Take 1 tablet (10 mg) by mouth 3 times daily as needed for muscle spasms    Other symptoms referable to back       DULoxetine 60 MG EC capsule    CYMBALTA    180 capsule    Take 2 capsules by mouth daily.        FLUoxetine 40 MG capsule    PROzac    20 capsule    Take 1 capsule by mouth daily.    Depressive disorder, not elsewhere classified       FLUTICASONE  PROPIONATE (NASAL) NA      Spray 1 spray in nostril as needed        * gabapentin 400 MG capsule    NEURONTIN     Take 1 capsule by mouth 3 times daily        * gabapentin 100 MG capsule    NEURONTIN     Take 300 mg by mouth 2 times daily        hyoscyamine 0.125 MG tablet    ANASPAZ/LEVSIN    60 tablet    Take 1-2 tablets (125-250 mcg) by mouth every 4 hours as needed for cramping    Morbid (severe) obesity due to excess calories (H)       omeprazole 20 MG CR capsule    priLOSEC    60 capsule    Take 1 capsule (20 mg) by mouth 2 times daily    Morbid (severe) obesity due to excess calories (H)       ondansetron 4 MG tablet    ZOFRAN    30 tablet    Take 1 tablet (4 mg) by mouth every 4 hours as needed for nausea    Morbid (severe) obesity due to excess calories (H)       topiramate 25 MG tablet    TOPAMAX    180 tablet    Take 3 tablets (75 mg) by mouth 2 times daily    Morbid obesity (H)       * Notice:  This list has 2 medication(s) that are the same as other medications prescribed for you. Read the directions carefully, and ask your doctor or other care provider to review them with you.

## 2018-10-18 NOTE — LETTER
10/18/2018       RE: Dara Anderson  [address] [address]     Dear Colleague,    Thank you for referring your patient, Dara Anderson, to the [facility] SURGICAL WEIGHT MANAGEMENT at [facility]. Please see a copy of my visit note below.    Nutrition Reassessment  Reason For Visit:  Dara Anderson is a 58 year old female presenting today for nutrition follow-up, 1 month s/p SG with Dr Puente on 9/11/18.  Patient referred by Dr Puente(9/20/18).    Anthropometrics:  Initial Consult Weight: 255.3 lbs  Day of Surgery Weight: 212.5 lbs  Current Weight: 193.3 lbs  Weight loss: -62 lbs from initial consult; -19.2 from day of surgery    Current Vitamins/Minerals: MVI/minerals BID, calcium, Vitamin B12, Vitamin D    Nutrition History:  B: deli meat cream of wheat  L: chicken or soup  D: chicken, chili, mac and cheese(1/4 cup), salmon, tuna    Progress with Previous Goals:  1) Follow bariatric low-fat full liquid diet through day 13 post-op, then to progress to pureed diet x 2 weeks(9/25).  If tolerating, may advance on day 29 post-op to bariatric soft diet(10/9). Met  2) Work towards 60 gm protein/day. Improving/continues  3) Consume 48-64 oz fluids daily- between meals. Met/continues  4) Eat slowly (>20 min/meal), chewing well to smooth consistency once on the bariatric soft diet. Met/continues  5) Limit portions to 1/2 cup/meal. Met/continues  6) Start chewable/liquid multivitamin/minerals twice daily. Met/ongoing    Nutrition Prescription:  Grams Protein: 60 (minimum)   Amount of Fluid: 48-64 oz    Nutrition Diagnosis  Previous: Food and nutrition-related knowledge deficit r/t lack of prior exposure to diet advancements beyond bariatric low-fat full liquid diet aeb pt unable to verbalize understanding of bariatric pureed and soft diets.    Current: Food and nutrition-related knowledge deficit r/t lack of prior exposure to diet advancements beyond bariatric  pureed diet aeb pt unable to verbalize full understanding of bariatric soft and regular consistency diets.    Intervention  Materials/Education provided on bariatric soft and regular consistency diets, protein intake, fluid intake, eating pace, chewing foods well, portion control, sugar/fat intake, recommended vitamin/mineral supplements.     Patient Understanding: good  Expected Compliance: good    Goals:  1) Follow soft diet for 4 weeks, then to progress to bariatric regular diet(11/6).   2) Consume 60 grams of protein/day.  3) Sip on 48-64 oz of fluids/day- between meals only.  4) Eat slowly (>20 min/meal), chewing foods well (to applesauce-like consistency).  5) Limit portions to 1/2 cup/meal.  6) Take the following supplements:    Multivitamin/minerals: adult dose 2 times daily    Iron: 45-60 mg elemental (18-36 mg if low risk) - may partly or fully be covered in multivitamin     Calcium Citrate containing vitamin D: 500 mg 3 times daily or 600 mg 2 times daily    Vitamin B12: sublingual form of at least 500 mcg daily or injection of 1000 mcg monthly     B-50 Complex once daily  7) Continue walking at least 1/2 mile per day.         Follow-Up: prn    Time spent with patient: 30 minutes.  Vicki Crawford, URBANO, LD

## 2018-10-18 NOTE — PATIENT INSTRUCTIONS
Plan:  1. RD visit today.  2. Start vitamin supplements per RD directions.  3. Advance diet per RD directions.  4. Decrease omeprazole to once daily, refill sent  5. Follow-up: 8 weeks with Alyssa and dietitian   6. Actigall prescription sent   7. B12 SL   8. Compare prices with bariatric advantage

## 2018-10-18 NOTE — PROGRESS NOTES
Labs faxed to [facility] [place] for 3 months post op. Gissel David RN on 10/18/2018 at 8:59 AM

## 2018-10-18 NOTE — PATIENT INSTRUCTIONS
Goals:  1) Follow soft diet for 4 weeks, then to progress to bariatric regular diet(11/6).   2) Consume 60 grams of protein/day.  3) Sip on 48-64 oz of fluids/day- between meals only.  4) Eat slowly (>20 min/meal), chewing foods well (to applesauce-like consistency).  5) Limit portions to 1/2 cup/meal.  6) Take the following supplements:    Multivitamin/minerals: adult dose 2 times daily    Iron: 45-60 mg elemental (18-36 mg if low risk) - may partly or fully be covered in multivitamin     Calcium Citrate containing vitamin D: 500 mg 3 times daily or 600 mg 2 times daily    Vitamin B12: sublingual form of at least 500 mcg daily or injection of 1000 mcg monthly     B-50 Complex once daily  7) Continue walking at least 1/2 mile per day.    Follow up with RD in two month    Vicki Crawford RD, LD  If you need to schedule or reschedule with a dietitian please call [phone].

## 2018-10-18 NOTE — PROGRESS NOTES
Nutrition Reassessment  Reason For Visit:  Dara Anderson is a 58 year old female presenting today for nutrition follow-up, 1 month s/p SG with Dr Puente on 9/11/18.  Patient referred by Dr Puente(9/20/18).    Anthropometrics:  Initial Consult Weight: 255.3 lbs  Day of Surgery Weight: 212.5 lbs  Current Weight: 193.3 lbs  Weight loss: -62 lbs from initial consult; -19.2 from day of surgery    Current Vitamins/Minerals: MVI/minerals BID, calcium, Vitamin B12, Vitamin D    Nutrition History:  B: deli meat cream of wheat  L: chicken or soup  D: chicken, chili, mac and cheese(1/4 cup), salmon, tuna    Progress with Previous Goals:  1) Follow bariatric low-fat full liquid diet through day 13 post-op, then to progress to pureed diet x 2 weeks(9/25).  If tolerating, may advance on day 29 post-op to bariatric soft diet(10/9). Met  2) Work towards 60 gm protein/day. Improving/continues  3) Consume 48-64 oz fluids daily- between meals. Met/continues  4) Eat slowly (>20 min/meal), chewing well to smooth consistency once on the bariatric soft diet. Met/continues  5) Limit portions to 1/2 cup/meal. Met/continues  6) Start chewable/liquid multivitamin/minerals twice daily. Met/ongoing    Nutrition Prescription:  Grams Protein: 60 (minimum)   Amount of Fluid: 48-64 oz    Nutrition Diagnosis  Previous: Food and nutrition-related knowledge deficit r/t lack of prior exposure to diet advancements beyond bariatric low-fat full liquid diet aeb pt unable to verbalize understanding of bariatric pureed and soft diets.    Current: Food and nutrition-related knowledge deficit r/t lack of prior exposure to diet advancements beyond bariatric pureed diet aeb pt unable to verbalize full understanding of bariatric soft and regular consistency diets.    Intervention  Materials/Education provided on bariatric soft and regular consistency diets, protein intake, fluid intake, eating pace, chewing foods well, portion control, sugar/fat intake,  recommended vitamin/mineral supplements.     Patient Understanding: good  Expected Compliance: good    Goals:  1) Follow soft diet for 4 weeks, then to progress to bariatric regular diet(11/6).   2) Consume 60 grams of protein/day.  3) Sip on 48-64 oz of fluids/day- between meals only.  4) Eat slowly (>20 min/meal), chewing foods well (to applesauce-like consistency).  5) Limit portions to 1/2 cup/meal.  6) Take the following supplements:    Multivitamin/minerals: adult dose 2 times daily    Iron: 45-60 mg elemental (18-36 mg if low risk) - may partly or fully be covered in multivitamin     Calcium Citrate containing vitamin D: 500 mg 3 times daily or 600 mg 2 times daily    Vitamin B12: sublingual form of at least 500 mcg daily or injection of 1000 mcg monthly     B-50 Complex once daily  7) Continue walking at least 1/2 mile per day.         Follow-Up: prn    Time spent with patient: 30 minutes.  Vicki Crawford, RD, LD

## 2018-10-22 ENCOUNTER — TELEPHONE (OUTPATIENT)
Dept: SURGERY | Facility: CLINIC | Age: 58
End: 2018-10-22

## 2018-10-23 NOTE — TELEPHONE ENCOUNTER
Prior Authorization Approval    Authorization Effective Date: 10/23/2018  Authorization Expiration Date: 12/31/2019  Medication: ursodiol (ACTIGALL) 300 MG capsule - APPROVED  Approved Dose/Quantity:   Reference #:     Insurance Company: [employer] - Phone [phone] Fax [phone]  Expected CoPay: $[id number]     CoPay Card Available:      Foundation Assistance Needed:    Which Pharmacy is filling the prescription (Not needed for infusion/clinic administered): [facility]  Pharmacy Notified: Yes  Patient Notified: Comment:  **Pharmacy will notify patient when script is ready for .**

## 2018-10-23 NOTE — TELEPHONE ENCOUNTER
PA Initiation    Medication: ursodiol (ACTIGALL) 300 MG capsule -   Insurance Company: [employer] - Phone [phone] Fax [phone]  Pharmacy Filling the Rx: [facility]  Filling Pharmacy Phone: [phone]  Filling Pharmacy Fax:    Start Date: 10/23/2018

## 2018-12-20 ENCOUNTER — OFFICE VISIT (OUTPATIENT)
Dept: ENDOCRINOLOGY | Facility: CLINIC | Age: 58
End: 2018-12-20
Payer: COMMERCIAL

## 2018-12-20 ENCOUNTER — OFFICE VISIT (OUTPATIENT)
Dept: SURGERY | Facility: CLINIC | Age: 58
End: 2018-12-20
Payer: COMMERCIAL

## 2018-12-20 VITALS
WEIGHT: 181 LBS | HEIGHT: 64 IN | DIASTOLIC BLOOD PRESSURE: 79 MMHG | OXYGEN SATURATION: 97 % | SYSTOLIC BLOOD PRESSURE: 118 MMHG | TEMPERATURE: 98.4 F | BODY MASS INDEX: 30.9 KG/M2 | HEART RATE: 63 BPM

## 2018-12-20 DIAGNOSIS — Z98.84 STATUS POST BARIATRIC SURGERY: ICD-10-CM

## 2018-12-20 DIAGNOSIS — Z98.84 STATUS POST BARIATRIC SURGERY: Primary | ICD-10-CM

## 2018-12-20 LAB
ALBUMIN SERPL-MCNC: 3.8 G/DL (ref 3.4–5)
ALP SERPL-CCNC: 71 U/L (ref 40–150)
ALT SERPL W P-5'-P-CCNC: 18 U/L (ref 0–50)
ANION GAP SERPL CALCULATED.3IONS-SCNC: 7 MMOL/L (ref 3–14)
AST SERPL W P-5'-P-CCNC: 13 U/L (ref 0–45)
BILIRUB SERPL-MCNC: 0.4 MG/DL (ref 0.2–1.3)
BUN SERPL-MCNC: 18 MG/DL (ref 7–30)
CALCIUM SERPL-MCNC: 9.3 MG/DL (ref 8.5–10.1)
CHLORIDE SERPL-SCNC: 101 MMOL/L (ref 94–109)
CO2 SERPL-SCNC: 28 MMOL/L (ref 20–32)
CREAT SERPL-MCNC: 0.6 MG/DL (ref 0.52–1.04)
DEPRECATED CALCIDIOL+CALCIFEROL SERPL-MC: 104 UG/L (ref 20–75)
ERYTHROCYTE [DISTWIDTH] IN BLOOD BY AUTOMATED COUNT: 13.4 % (ref 10–15)
GFR SERPL CREATININE-BSD FRML MDRD: >90 ML/MIN/{1.73_M2}
GLUCOSE SERPL-MCNC: 87 MG/DL (ref 70–99)
HBA1C MFR BLD: 5.6 % (ref 0–5.6)
HCT VFR BLD AUTO: 41.2 % (ref 35–47)
HGB BLD-MCNC: 13.6 G/DL (ref 11.7–15.7)
MCH RBC QN AUTO: 29.4 PG (ref 26.5–33)
MCHC RBC AUTO-ENTMCNC: 33 G/DL (ref 31.5–36.5)
MCV RBC AUTO: 89 FL (ref 78–100)
PLATELET # BLD AUTO: 301 10E9/L (ref 150–450)
POTASSIUM SERPL-SCNC: 4 MMOL/L (ref 3.4–5.3)
PROT SERPL-MCNC: 7.4 G/DL (ref 6.8–8.8)
PTH-INTACT SERPL-MCNC: 26 PG/ML (ref 18–80)
RBC # BLD AUTO: 4.62 10E12/L (ref 3.8–5.2)
SODIUM SERPL-SCNC: 137 MMOL/L (ref 133–144)
VIT B12 SERPL-MCNC: 2764 PG/ML (ref 193–986)
WBC # BLD AUTO: 6.1 10E9/L (ref 4–11)

## 2018-12-20 ASSESSMENT — PAIN SCALES - GENERAL: PAINLEVEL: NO PAIN (0)

## 2018-12-20 ASSESSMENT — MIFFLIN-ST. JEOR: SCORE: 1390.69

## 2018-12-20 NOTE — PATIENT INSTRUCTIONS
Goals:  1) Follow bariatric regular diet.   2) Consume 60 grams of protein/day.  3) Sip on 48-64 oz of fluids/day- between meals only.  4) Eat slowly (>20 min/meal), chewing foods well (to applesauce-like consistency).  5) Limit portions to 1/2 cup/meal.  6) Take the following supplements:    Multivitamin/minerals: adult dose 2 times daily    Iron: 45-60 mg elemental (18-36 mg if low risk) - may partly or fully be covered in multivitamin     Calcium Citrate containing vitamin D: 500 mg 3 times daily or 600 mg 2 times daily    Vitamin B12: sublingual form of at least 500 mcg daily or injection of 1000 mcg monthly     B-50 Complex once daily  7) Continue walking at least 1/2 mile per day.    Follow up with RD in three months    Vicki Crawford RD, LD  If you need to schedule or reschedule with a dietitian please call [phone].

## 2018-12-20 NOTE — LETTER
2018       RE: Dara Anderson  [address] [address]     Dear Colleague,    Thank you for referring your patient, Dara Anderson, to the [facility]IGHT MANAGEMENT at [facility]. Please see a copy of my visit note below.        Return Bariatric Surgery Note    RE: Dara Anderson  MR#: [id number]  : 1960  VISIT DATE: Dec 20, 2018    Dear Letitia Vu,    I had the pleasure of seeing your patient, Dara Anderson, in my post-bariatric surgery assessment clinic.    CHIEF COMPLAINT: Post-bariatric surgery follow-up    HISTORY OF PRESENT ILLNESS:  3 months post op  Eating going well, no reflux, no vomiting  Still excited about feeling full after meals  Sometimes hungry between meal but more when not drinking enough water  Attending support group in [place]    Weight History:     Current Weight: Weight: 82.1 kg (181 lb)   Initial weight 255.3lb  Surgery date 212.5lb         Questions Regarding Co-Morbidities and Health Concerns Reviewed With Patient 10/16/2017   Do you use a CPAP? Yes       Eating Habits 10/16/2017   How many meals do you eat per day? 3   Do you snack between meals? Sometimes       Exercise Questions Reviewed With Patient 10/16/2017   How often do you exercise? 1 to 2 times per week   What is the duration of your exercise (in minutes)? 10 Minutes   What types of exercise do you do? walking   What keeps you from being more active?  Pain   back pain intermittently, affecting activity some    Medications:  Current Outpatient Medications   Medication     acetaminophen (TYLENOL) 325 MG tablet     cyclobenzaprine (FLEXERIL) 10 MG tablet     DULoxetine (CYMBALTA) 60 MG capsule     FLUoxetine (PROZAC) 40 MG capsule     FLUTICASONE PROPIONATE, NASAL, NA     gabapentin (NEURONTIN) 400 MG capsule     omeprazole (PRILOSEC) 20 MG CR capsule     ondansetron (ZOFRAN) 4 MG tablet     hyoscyamine (ANASPAZ/LEVSIN) 0.125 MG  "tablet     topiramate (TOPAMAX) 25 MG tablet     No current facility-administered medications for this visit.      No flowsheet data found.    ROS:  GI: denies concerns  Skin: denies concerns  Psych: denies concerns  Female Only:   BAR RBS ROS - FEMALE ONLY 10/16/2017   Female only: Post-menopausal       LABS/IMAGING/MEDICAL RECORDS REVIEW:     PHYSICAL EXAMINATION:  /79 (BP Location: Left arm, Patient Position: Chair, Cuff Size: Adult Regular)   Pulse 63   Temp 98.4  F (36.9  C) (Oral)   Ht 1.625 m (5' 3.98\")   Wt 82.1 kg (181 lb)   SpO2 97%   BMI 31.09 kg/m          ASSESSMENT AND PLAN:      1. 3 months status laparoscopic gastric sleeve  2. Morbid Obesity Class 1 current BMI: Body mass index is 31.09 kg/m .  3. Post surgical malabsorption:   Labs ordered per protocol.   Follow food plan per dietitian recommendations.   Continue taking recommended post-op vitamins.  4. Return to clinic in 3 months.    I spent a total of 15 minutes face to face with Dara during today's office visit. Over 50% of this time was spent counseling the patient and/or coordinating care.      Gissel David NP      "

## 2018-12-20 NOTE — LETTER
12/20/2018       RE: Dara Anderson  [address] [address]     Dear Colleague,    Thank you for referring your patient, Dara Anderson, to the [facility] SURGICAL WEIGHT MANAGEMENT at [facility]. Please see a copy of my visit note below.    Nutrition Reassessment  Reason For Visit:  Dara Anderson is a 58 year old female presenting today for nutrition follow-up, 3 months s/p SG with Dr Puente on 9/11/18.  Patient referred by Dr Puente(9/20/18).    Anthropometrics:  Initial Consult Weight: 255.3 lbs  Day of Surgery Weight: 212.5 lbs  Current Weight: 181 lbs  Weight loss: -74.3 lbs from initial consult; -31.5 lbs from day of surgery    Current Vitamins/Minerals: MVI/minerals BID, calcium, Vitamin B12, Vitamin D    Nutrition History:  Progress with Previous Goals:  1) Follow soft diet for 4 weeks, then to progress to bariatric regular diet(11/6). met  2) Consume 60 grams of protein/day. Improving/continues  3) Sip on 48-64 oz of fluids/day- between meals only. At least 40 oz per day  4) Eat slowly (>20 min/meal), chewing foods well (to applesauce-like consistency). continues  5) Limit portions to 1/2 cup/meal. Continues, 1/4 cup   6) Take the following supplements: continues    Multivitamin/minerals: adult dose 2 times daily    Iron: 45-60 mg elemental (18-36 mg if low risk) - may partly or fully be covered in multivitamin     Calcium Citrate containing vitamin D: 500 mg 3 times daily or 600 mg 2 times daily    Vitamin B12: sublingual form of at least 500 mcg daily or injection of 1000 mcg monthly     B-50 Complex once daily  7) Continue walking at least 1/2 mile per day. continues    Nutrition Prescription:  Grams Protein: 60 (minimum)   Amount of Fluid: 48-64 oz    Nutrition Diagnosis  Previous: Food and nutrition-related knowledge deficit r/t lack of prior exposure to diet advancements beyond bariatric pureed diet aeb pt unable to verbalize full  understanding of bariatric soft and regular consistency diets.    Current: Food and nutrition-related knowledge deficit r/t lack of prior exposure to diet instruction beyond 3 months s/p SG as evidenced by Pt seeking further guidance from RD on diet instruction beyond 3 months s/p SG.     Intervention  Materials/Education provided on bariatric soft and regular consistency diets, protein intake, fluid intake, eating pace, chewing foods well, portion control, sugar/fat intake, recommended vitamin/mineral supplements.     Patient Understanding: good  Expected Compliance: good    Goals:  1) Follow bariatric regular diet.   2) Consume 60 grams of protein/day.  3) Sip on 48-64 oz of fluids/day- between meals only.  4) Eat slowly (>20 min/meal), chewing foods well (to applesauce-like consistency).  5) Limit portions to 1/2 cup/meal.  6) Take the following supplements:    Multivitamin/minerals: adult dose 2 times daily    Iron: 45-60 mg elemental (18-36 mg if low risk) - may partly or fully be covered in multivitamin     Calcium Citrate containing vitamin D: 500 mg 3 times daily or 600 mg 2 times daily    Vitamin B12: sublingual form of at least 500 mcg daily or injection of 1000 mcg monthly     B-50 Complex once daily  7) Continue walking at least 1/2 mile per day.         Follow-Up: prn    Time spent with patient: 15 minutes.  Vicki Crawford, URBANO, LD

## 2018-12-20 NOTE — PROGRESS NOTES
Nutrition Reassessment  Reason For Visit:  Dara Anderson is a 58 year old female presenting today for nutrition follow-up, 3 months s/p SG with Dr Puente on 9/11/18.  Patient referred by Dr Puente(9/20/18).    Anthropometrics:  Initial Consult Weight: 255.3 lbs  Day of Surgery Weight: 212.5 lbs  Current Weight: 181 lbs  Weight loss: -74.3 lbs from initial consult; -31.5 lbs from day of surgery    Current Vitamins/Minerals: MVI/minerals BID, calcium, Vitamin B12, Vitamin D    Nutrition History:  Progress with Previous Goals:  1) Follow soft diet for 4 weeks, then to progress to bariatric regular diet(11/6). met  2) Consume 60 grams of protein/day. Improving/continues  3) Sip on 48-64 oz of fluids/day- between meals only. At least 40 oz per day  4) Eat slowly (>20 min/meal), chewing foods well (to applesauce-like consistency). continues  5) Limit portions to 1/2 cup/meal. Continues, 1/4 cup   6) Take the following supplements: continues    Multivitamin/minerals: adult dose 2 times daily    Iron: 45-60 mg elemental (18-36 mg if low risk) - may partly or fully be covered in multivitamin     Calcium Citrate containing vitamin D: 500 mg 3 times daily or 600 mg 2 times daily    Vitamin B12: sublingual form of at least 500 mcg daily or injection of 1000 mcg monthly     B-50 Complex once daily  7) Continue walking at least 1/2 mile per day. continues    Nutrition Prescription:  Grams Protein: 60 (minimum)   Amount of Fluid: 48-64 oz    Nutrition Diagnosis  Previous: Food and nutrition-related knowledge deficit r/t lack of prior exposure to diet advancements beyond bariatric pureed diet aeb pt unable to verbalize full understanding of bariatric soft and regular consistency diets.    Current: Food and nutrition-related knowledge deficit r/t lack of prior exposure to diet instruction beyond 3 months s/p SG as evidenced by Pt seeking further guidance from RD on diet instruction beyond 3 months s/p SG.      Intervention  Materials/Education provided on bariatric soft and regular consistency diets, protein intake, fluid intake, eating pace, chewing foods well, portion control, sugar/fat intake, recommended vitamin/mineral supplements.     Patient Understanding: good  Expected Compliance: good    Goals:  1) Follow bariatric regular diet.   2) Consume 60 grams of protein/day.  3) Sip on 48-64 oz of fluids/day- between meals only.  4) Eat slowly (>20 min/meal), chewing foods well (to applesauce-like consistency).  5) Limit portions to 1/2 cup/meal.  6) Take the following supplements:    Multivitamin/minerals: adult dose 2 times daily    Iron: 45-60 mg elemental (18-36 mg if low risk) - may partly or fully be covered in multivitamin     Calcium Citrate containing vitamin D: 500 mg 3 times daily or 600 mg 2 times daily    Vitamin B12: sublingual form of at least 500 mcg daily or injection of 1000 mcg monthly     B-50 Complex once daily  7) Continue walking at least 1/2 mile per day.         Follow-Up: prn    Time spent with patient: 15 minutes.  Vicki Crawford, RD, LD

## 2018-12-20 NOTE — PATIENT INSTRUCTIONS
It was a pleasure meeting with you today.     Thank you for allowing us the privilege of caring for you. We hope we provided you with the excellent service you deserve.     Please let us know if there is anything else we can do for you so that we can be sure you are leaving completely satisfied with your care experience.      You saw Gissel David CNP today.     Instructions per today's visit:  1. Labs today  2. Follow up 3 months with myself and dietician, no labs at that time.   3. Keep up the good work!      To schedule appointments with our team, please call [phone] option #1    Please call during clinic hours Monday through Friday 8:00a - 4:00p if you have questions or you can contact us via [employer] at anytime.      Nurses: [phone]  Fax: [phone]  Surgery Scheduler: [phone]    Please call the hospital at [phone] to speak with our on call MDs if you have urgent needs after hours, during weekends, or holidays.    **Please note if you need to go to the Emergency Room for any reason in the first 90 days after surgery it is important to go to the [facility] ER on the [address] on [address]. hospitals off of the [address]    *For patients who are currently enrolled in our program and have not yet had weight loss surgery please note*:      Main follow-up parameters for all bariatric patients     Patients who have undergone Bariatric surgery:    1. Follow-up interval during the first year: ~1 week, 1 month, 3 month, 6 month,12 months.  Every 6 months until 5 years; and then annually thereafter.  The goal of follow-up sessions is to ensure that food intake behavior (choice of food and eating speed) and exercise/activity level are set appropriately.    2. Yearly lab tests ordered by our clinic.      3. The bariatric team should be aware and potentially evaluate all adverse gastrointestinal symptoms (dysphagia, abdominal pain, nausea, vomiting, diarrhea, heartburn, reflux, etc),  which can be a sign of complication.  The bariatric surgeons perform general surgery procedures in addition to bariatric surgery (laparoscopic cholecystectomy, etc.)    4. Inability to tolerate textured food (chicken, steak, fish, eggs, beans) is NOT normal and may need to be evaluated by endoscopy performed by the bariatric surgeon.

## 2018-12-20 NOTE — PROGRESS NOTES
Return Bariatric Surgery Note    RE: Dara Anderson  MR#: [id number]  : 1960  VISIT DATE: Dec 20, 2018    Dear Letitia Vu,    I had the pleasure of seeing your patient, Dara Anderson, in my post-bariatric surgery assessment clinic.    CHIEF COMPLAINT: Post-bariatric surgery follow-up    HISTORY OF PRESENT ILLNESS:  3 months post op  Eating going well, no reflux, no vomiting  Still excited about feeling full after meals  Sometimes hungry between meal but more when not drinking enough water  Attending support group in [place]    Weight History:     Current Weight: Weight: 82.1 kg (181 lb)   Initial weight 255.3lb  Surgery date 212.5lb         Questions Regarding Co-Morbidities and Health Concerns Reviewed With Patient 10/16/2017   Do you use a CPAP? Yes       Eating Habits 10/16/2017   How many meals do you eat per day? 3   Do you snack between meals? Sometimes       Exercise Questions Reviewed With Patient 10/16/2017   How often do you exercise? 1 to 2 times per week   What is the duration of your exercise (in minutes)? 10 Minutes   What types of exercise do you do? walking   What keeps you from being more active?  Pain   back pain intermittently, affecting activity some    Medications:  Current Outpatient Medications   Medication     acetaminophen (TYLENOL) 325 MG tablet     cyclobenzaprine (FLEXERIL) 10 MG tablet     DULoxetine (CYMBALTA) 60 MG capsule     FLUoxetine (PROZAC) 40 MG capsule     FLUTICASONE PROPIONATE, NASAL, NA     gabapentin (NEURONTIN) 400 MG capsule     omeprazole (PRILOSEC) 20 MG CR capsule     ondansetron (ZOFRAN) 4 MG tablet     hyoscyamine (ANASPAZ/LEVSIN) 0.125 MG tablet     topiramate (TOPAMAX) 25 MG tablet     No current facility-administered medications for this visit.      No flowsheet data found.    ROS:  GI: denies concerns  Skin: denies concerns  Psych: denies concerns  Female Only:   BAR RBS ROS - FEMALE ONLY 10/16/2017   Female only: Post-menopausal  "      LABS/IMAGING/MEDICAL RECORDS REVIEW:     PHYSICAL EXAMINATION:  /79 (BP Location: Left arm, Patient Position: Chair, Cuff Size: Adult Regular)   Pulse 63   Temp 98.4  F (36.9  C) (Oral)   Ht 1.625 m (5' 3.98\")   Wt 82.1 kg (181 lb)   SpO2 97%   BMI 31.09 kg/m         ASSESSMENT AND PLAN:      1. 3 months status laparoscopic gastric sleeve  2. Morbid Obesity Class 1 current BMI: Body mass index is 31.09 kg/m .  3. Post surgical malabsorption:   Labs ordered per protocol.   Follow food plan per dietitian recommendations.   Continue taking recommended post-op vitamins.  4. Return to clinic in 3 months.    Sincerely,    Gissel David NP    I spent a total of 15 minutes face to face with Dara during today's office visit. Over 50% of this time was spent counseling the patient and/or coordinating care.  "

## 2018-12-22 ENCOUNTER — MYC MEDICAL ADVICE (OUTPATIENT)
Dept: SURGERY | Facility: CLINIC | Age: 58
End: 2018-12-22

## 2018-12-23 LAB
ANNOTATION COMMENT IMP: NORMAL
RETINYL PALMITATE SERPL-MCNC: 0.04 MG/L (ref 0–0.1)
VIT A SERPL-MCNC: 0.46 MG/L (ref 0.3–1.2)

## 2019-03-31 DIAGNOSIS — E66.01 MORBID (SEVERE) OBESITY DUE TO EXCESS CALORIES (H): ICD-10-CM

## 2019-06-06 ENCOUNTER — OFFICE VISIT (OUTPATIENT)
Dept: SURGERY | Facility: CLINIC | Age: 59
End: 2019-06-06
Payer: COMMERCIAL

## 2019-06-06 VITALS
HEART RATE: 72 BPM | HEIGHT: 64 IN | WEIGHT: 181.6 LBS | DIASTOLIC BLOOD PRESSURE: 62 MMHG | SYSTOLIC BLOOD PRESSURE: 104 MMHG | OXYGEN SATURATION: 98 % | BODY MASS INDEX: 31 KG/M2

## 2019-06-06 DIAGNOSIS — E66.01 MORBID OBESITY DUE TO EXCESS CALORIES (H): ICD-10-CM

## 2019-06-06 DIAGNOSIS — Z86.39 HISTORY OF DIABETES MELLITUS: ICD-10-CM

## 2019-06-06 DIAGNOSIS — Z98.84 BARIATRIC SURGERY STATUS: Primary | ICD-10-CM

## 2019-06-06 RX ORDER — NORTRIPTYLINE HCL 25 MG
CAPSULE ORAL
COMMUNITY
Start: 2019-05-10

## 2019-06-06 RX ORDER — METHYLPREDNISOLONE 4 MG
TABLET, DOSE PACK ORAL
Refills: 0 | COMMUNITY
Start: 2019-06-03

## 2019-06-06 RX ORDER — HYDROCODONE BITARTRATE AND ACETAMINOPHEN 5; 325 MG/1; MG/1
1 TABLET ORAL EVERY 6 HOURS PRN
COMMUNITY

## 2019-06-06 ASSESSMENT — MIFFLIN-ST. JEOR: SCORE: 1388.35

## 2019-06-06 ASSESSMENT — PATIENT HEALTH QUESTIONNAIRE - PHQ9
SUM OF ALL RESPONSES TO PHQ QUESTIONS 1-9: 15
SUM OF ALL RESPONSES TO PHQ QUESTIONS 1-9: 15

## 2019-06-06 ASSESSMENT — PAIN SCALES - GENERAL: PAINLEVEL: NO PAIN (0)

## 2019-06-06 NOTE — PATIENT INSTRUCTIONS
Goals:  1) Follow bariatric regular diet.   2) Consume 60 grams of protein/day.  3) Sip on 48-64 oz of fluids/day- between meals only.  4) Eat slowly (>20 min/meal), chewing foods well (to applesauce-like consistency).  5) Limit portions to 1/2-1 cup/meal.  6) Take the following supplements:    Multivitamin/minerals: adult dose 2 times daily    Iron: 45-60 mg elemental (18-36 mg if low risk) - may partly or fully be covered in multivitamin     Calcium Citrate containing vitamin D: 500 mg 3 times daily or 600 mg 2 times daily    Vitamin B12: sublingual form of at least 500 mcg daily or injection of 1000 mcg monthly     B-50 Complex once daily  7) Continue walking at least 1/2 mile per day.      Follow up with RD in three months (1 year post op)    Vicki Crawford RD, LD  If you need to schedule or reschedule with a dietitian please call [phone].

## 2019-06-06 NOTE — LETTER
6/6/2019       RE: Dara Anderson  [address] [address]     Dear Colleague,    Thank you for referring your patient, Dara Anderson, to the [facility] SURGICAL WEIGHT MANAGEMENT at [facility]. Please see a copy of my visit note below.    Nutrition Reassessment  Reason For Visit:  Dara Anderson is a 58 year old female presenting today for nutrition follow-up, 9 months s/p SG with Dr Puente on 9/11/18.  Patient referred by Dr Puente(9/20/18) and Gissel David.    Anthropometrics:  Initial Consult Weight: 255.3 lbs  Day of Surgery Weight: 212.5 lbs  Current Weight: 181.6 lbs  Weight loss: -73.7 lbs from initial consult; -30.9 lbs from day of surgery    Current Vitamins/Minerals: MVI/minerals BID, calcium, Vitamin B12, Vitamin D    Nutrition History:  Recent food recall:  Breakfast: wheat toast with PB  occ yogurt or kind bar  Lunch: sandwich or burger(1/2 richar) without bun  Dinner: protein  Snacks: kind bar, yogurt or PB  Beverages: water(16x 3) occ ice tea(diet snapple)     1) Follow bariatric regular diet. met   2) Consume 60 grams of protein/day. continues   3) Sip on 48-64 oz of fluids/day- between meals only. met   4) Eat slowly (>20 min/meal), chewing foods well (to applesauce-like consistency). met   5) Limit portions to 1/2 cup/meal. Met, 1/2- 3/4 cup   6) Take the following supplements: met     Multivitamin/minerals: adult dose 2 times daily    Iron: 45-60 mg elemental (18-36 mg if low risk) - may partly or fully be covered in multivitamin     Calcium Citrate containing vitamin D: 500 mg 3 times daily or 600 mg 2 times daily    Vitamin B12: sublingual form of at least 500 mcg daily or injection of 1000 mcg monthly     B-50 Complex once daily  7) Continue walking at least 1/2 mile per day. Decreased this past week due to pain 1/2 mile twice per day, trying to go to the gym     Nutrition Prescription:  Grams Protein: 60 (minimum)   Amount of Fluid:  48-64 oz    Nutrition Diagnosis  Previous: Food and nutrition-related knowledge deficit r/t lack of prior exposure to diet instruction beyond 3 months s/p SG as evidenced by Pt seeking further guidance from RD on diet instruction beyond 3 months s/p SG. - resolved    Current: Obesity r/t long history of self-monitoring deficit and excessive energy intake aeb BMI >30.     Intervention  Materials/Education provided, reviewed bariatric regular diet, protein intake, fluid intake, eating pace, chewing foods well, portion control, sugar/fat intake, recommended vitamin/mineral supplements. Patient noted decreased activity due to back pain but plans to get back to regular exercise after her mother moves out next week.  She may have nuts/seed or peanut butter 1-2 times per day, encouraged watching portions of foods that are high in calories from small portions to help with continued weight loss and maintenance.    Patient Understanding: good  Expected Compliance: good    Goals:  1) Follow bariatric regular diet.   2) Consume 60 grams of protein/day.  3) Sip on 48-64 oz of fluids/day- between meals only.  4) Eat slowly (>20 min/meal), chewing foods well (to applesauce-like consistency).  5) Limit portions to 1/2-1 cup/meal.  6) Take the following supplements:    Multivitamin/minerals: adult dose 2 times daily    Iron: 45-60 mg elemental (18-36 mg if low risk) - may partly or fully be covered in multivitamin     Calcium Citrate containing vitamin D: 500 mg 3 times daily or 600 mg 2 times daily    Vitamin B12: sublingual form of at least 500 mcg daily or injection of 1000 mcg monthly     B-50 Complex once daily  7) Continue walking at least 1/2 mile per day.    Follow-Up: prn    Time spent with patient: 15 minutes.  Vicki Crawford, RD, LD

## 2019-06-06 NOTE — LETTER
2019       RE: Dara Anderson  [address] [address]     Dear Colleague,    Thank you for referring your patient, Dara Anderson, to the [facility] SURGICAL WEIGHT MANAGEMENT at [facility]. Please see a copy of my visit note below.        Return Bariatric Surgery Note    RE: Dara Anderson  MR#: [id number]  : 1960  VISIT DATE: 2019    Dear Letitia Vu,    I had the pleasure of seeing your patient, Dara Anderson, in my post-bariatric surgery assessment clinic.    CHIEF COMPLAINT: Post-bariatric surgery follow-up    HISTORY OF PRESENT ILLNESS:  Questions Regarding Prior Weight Loss Surgery Reviewed With Patient 2019   I had the following weight loss procedure: Sleeve Gastrectomy   What year was your surgery? 2018   How has your weight changed since your last visit? I have lost weight   Are you currently taking any weight loss medications? No   Do you currently have any of the following: Sleep Apnea   Have you been to the Emergency room since your last visit with us? No   Were you in the hospital since your last visit with us? No   Do you have any concerns today? no   Sleeve 18 Dr. Puente     Last follow up 18 at 3 months post op, now 9mo post op    Sleep apnea-controlled, still wears CPAP- no issues    Support group- enjoys    Acute back pain from fusion with weight loss- less cushion, increased need for pain medication and decreased activity due to this. Has seen pain clinic, scheduled for injection    GERD- none, taking omeprazole once daily 20mg    BM normal     Hydration- adequate 3 16oz bottles daily     Mood: mood worse in the last month, she feels this is directly related to pain 7/10 most of the time. Feels mood is improving since meeting with pain clinic and knowing source of pain and plan moving forward.       Weight History:     2019   What is your highest lifetime weight? 255   What is your  lowest weight since surgery? (In pounds) 170     Initial Weight: 115.8 kg (255 lb 6.4 oz)  Current Weight: Weight: 82.4 kg (181 lb 9.6 oz)  Cumulative weight loss (lbs): 73.8  Last Visits Weight: 91.7 kg (202 lb 3.2 oz)    Questions Regarding Co-Morbidities and Health Concerns Reviewed With Patient 6/6/2019   Pre-diabetes: Gone away   Diabetes II: Never   High Blood Pressure: Never   High cholesterol: Improved   Heartburn/Reflux: Gone away   Sleep apnea: Improved   Do you use a CPAP? Yes   PCOS: Never   Back pain: Improved   Joint pain: Improved   Lower leg swelling: Never       Eating Habits 6/6/2019   How many meals do you eat per day? 4   Do you snack between meals? Yes   How much food are you eating at each meal? Less than 1/2 cup   Are you able to separate your meals and liquids by at least 30 minutes? Yes   Are you able to avoid liquid calories? Yes       Exercise Questions Reviewed With Patient 6/6/2019   How often do you exercise? 5 to 6 times per week   What is the duration of your exercise (in minutes)? 15 Minutes   What types of exercise do you do? walking, climbing stairs at work   What keeps you from being more active?  Pain       Social History:      6/6/2019   Are you smoking? No   Are you drinking alcohol? No       Medications:  Current Outpatient Medications   Medication     acetaminophen (TYLENOL) 325 MG tablet     acetaminophen-codeine (TYLENOL #3) 300-30 MG tablet     cyclobenzaprine (FLEXERIL) 10 MG tablet     DULoxetine (CYMBALTA) 60 MG capsule     FLUoxetine (PROZAC) 40 MG capsule     FLUTICASONE PROPIONATE, NASAL, NA     gabapentin (NEURONTIN) 400 MG capsule     HYDROcodone-acetaminophen (NORCO) 5-325 MG tablet     nortriptyline (PAMELOR) 25 MG capsule     omeprazole (PRILOSEC) 20 MG CR capsule     methylPREDNISolone (MEDROL DOSEPAK) 4 MG tablet therapy pack     No current facility-administered medications for this visit.          6/6/2019   Do you avoid NSAIDs such as (Ibuprofen, Aleve,  "Naproxen, Advil)?   Yes       ROS:  GI:      6/6/2019   Vomiting: No   Diarrhea: No   Constipation: No   Swallowing trouble: No   Abdominal pain: No   Heartburn: No   Rash in skin folds: No   Depression: No   Stress urinary incontinence No     Skin:   BAR RBS ROS - SKIN 6/6/2019   Rash in skin folds: No     Psych:      6/6/2019   Depression: No   Anxiety: No     Female Only:   BAR RBS ROS - FEMALE ONLY 6/6/2019   Female only: Post-menopausal       LABS/IMAGING/MEDICAL RECORDS REVIEW:     PHYSICAL EXAMINATION:  /62 (BP Location: Left arm, Patient Position: Sitting, Cuff Size: Adult Regular)   Pulse 72   Ht 1.625 m (5' 3.98\")   Wt 82.4 kg (181 lb 9.6 oz)   SpO2 98%   BMI 31.19 kg/m      General: No apparent distress  Neuro: A & O x 3  Head: Atraumatic, normocephalic  Skin: warm and dry, no rashes on exposed skin  Respiratory: respirations unlabored  Abdomen: soft, non-tender  Extremities: No LE swelling    ASSESSMENT AND PLAN:      1. 9 months status laparoscopic gastric sleeve  2. Morbid Obesity current BMI: Body mass index is 31.19 kg/m .  3. Post surgical malabsorption:   Labs ordered per protocol.   Follow food plan per dietitian recommendations.   Continue taking recommended post-op vitamins.  4. Return to clinic in 1 year or sooner if issues (pt would like to avoid 1yr apt due to travel, labs for 1 year ordered).    For back pain  Talk to spine team about physical therapy to increase core strength   Would not recommend NSAIDS     GERD  Can wean off omeprazole if able     Sincerely,    Gissel David NP    I spent a total of 15 minutes face to face with Dara during today's office visit. Over 50% of this time was spent counseling the patient and/or coordinating care.  "

## 2019-06-06 NOTE — NURSING NOTE
"(   Chief Complaint   Patient presents with     RECHECK     RBS S/P LSG 9.11.18    )    ( Weight: 82.4 kg (181 lb 9.6 oz) )  ( Height: 162.5 cm (5' 3.98\") )  ( BMI (Calculated): 31.19 )  ( Initial Weight: 115.8 kg (255 lb 6.4 oz) )  ( Cumulative weight loss (lbs): 73.8 )  ( Last Visits Weight: 91.7 kg (202 lb 3.2 oz) )  ( Wt change since last visit (lbs): -20.6 )  (   )  (   )    ( BP: 104/62 )  (   )  (   )  (   )  ( Pulse: 72 )  (   )  ( SpO2: 98 % )    (   Patient Active Problem List   Diagnosis     Degeneration of lumbar or lumbosacral intervertebral disc     Sprain of thoracic region     Myalgia and myositis     Nonspecific reaction to tuberculin skin test without active tuberculosis     Other symptoms referable to back     Hyperlipidemia     Premature menopause     Pseudogout     Adjustment disorder with mixed anxiety and depressed mood     Major depressive disorder, recurrent episode, moderate (H)     S/P total knee replacement     LUCIANO (obstructive sleep apnea)     Morbid (severe) obesity due to excess calories (H)    )  (   Current Outpatient Medications   Medication Sig Dispense Refill     acetaminophen (TYLENOL) 325 MG tablet Take 2 tablets (650 mg) by mouth every 6 hours as needed for mild pain 100 tablet 0     cyclobenzaprine (FLEXERIL) 10 MG tablet Take 1 tablet (10 mg) by mouth 3 times daily as needed for muscle spasms (Patient taking differently: Take 10 mg by mouth as needed for muscle spasms ) 90 tablet 5     DULoxetine (CYMBALTA) 60 MG capsule Take 2 capsules by mouth daily. (Patient taking differently: Take 120 mg by mouth At Bedtime ) 180 capsule 3     FLUoxetine (PROZAC) 40 MG capsule Take 1 capsule by mouth daily. (Patient taking differently: Take 40 mg by mouth every morning ) 20 capsule 0     FLUTICASONE PROPIONATE, NASAL, NA Spray 1 spray in nostril as needed        gabapentin (NEURONTIN) 400 MG capsule Take 800 mg by mouth 3 times daily        omeprazole (PRILOSEC) 20 MG CR capsule Take 1 " capsule (20 mg) by mouth daily 60 capsule 1     ondansetron (ZOFRAN) 4 MG tablet Take 1 tablet (4 mg) by mouth every 4 hours as needed for nausea 30 tablet 0     topiramate (TOPAMAX) 25 MG tablet Take 3 tablets (75 mg) by mouth 2 times daily (Patient not taking: Reported on 9/20/2018) 180 tablet 3    )  ( Diabetes Eval:    )    ( Pain Eval:  No Pain (0) )    ( Wound Eval:       )    (   History   Smoking Status     Former Smoker     Quit date: 1/1/1980   Smokeless Tobacco     Never Used     Comment: Quit in the l980's-smoked about three years    )    ( Signed By:  Sis Pablo; June 6, 2019; 8:05 AM )

## 2019-06-06 NOTE — PROGRESS NOTES
Return Bariatric Surgery Note    RE: Dara Anderson  MR#: [id number]  : 1960  VISIT DATE: 2019    Dear Letitia Vu,    I had the pleasure of seeing your patient, Dara Anderson, in my post-bariatric surgery assessment clinic.    CHIEF COMPLAINT: Post-bariatric surgery follow-up    HISTORY OF PRESENT ILLNESS:  Questions Regarding Prior Weight Loss Surgery Reviewed With Patient 2019   I had the following weight loss procedure: Sleeve Gastrectomy   What year was your surgery? 2018   How has your weight changed since your last visit? I have lost weight   Are you currently taking any weight loss medications? No   Do you currently have any of the following: Sleep Apnea   Have you been to the Emergency room since your last visit with us? No   Were you in the hospital since your last visit with us? No   Do you have any concerns today? no   Sleeve 18 Dr. Puente     Last follow up 18 at 3 months post op, now 9mo post op    Sleep apnea-controlled, still wears CPAP- no issues    Support group- enjoys    Acute back pain from fusion with weight loss- less cushion, increased need for pain medication and decreased activity due to this. Has seen pain clinic, scheduled for injection    GERD- none, taking omeprazole once daily 20mg    BM normal     Hydration- adequate 3 16oz bottles daily     Mood: mood worse in the last month, she feels this is directly related to pain 7/10 most of the time. Feels mood is improving since meeting with pain clinic and knowing source of pain and plan moving forward.       Weight History:     2019   What is your highest lifetime weight? 255   What is your lowest weight since surgery? (In pounds) 170     Initial Weight: 115.8 kg (255 lb 6.4 oz)  Current Weight: Weight: 82.4 kg (181 lb 9.6 oz)  Cumulative weight loss (lbs): 73.8  Last Visits Weight: 91.7 kg (202 lb 3.2 oz)    Questions Regarding Co-Morbidities and Health Concerns Reviewed With Patient  6/6/2019   Pre-diabetes: Gone away   Diabetes II: Never   High Blood Pressure: Never   High cholesterol: Improved   Heartburn/Reflux: Gone away   Sleep apnea: Improved   Do you use a CPAP? Yes   PCOS: Never   Back pain: Improved   Joint pain: Improved   Lower leg swelling: Never       Eating Habits 6/6/2019   How many meals do you eat per day? 4   Do you snack between meals? Yes   How much food are you eating at each meal? Less than 1/2 cup   Are you able to separate your meals and liquids by at least 30 minutes? Yes   Are you able to avoid liquid calories? Yes       Exercise Questions Reviewed With Patient 6/6/2019   How often do you exercise? 5 to 6 times per week   What is the duration of your exercise (in minutes)? 15 Minutes   What types of exercise do you do? walking, climbing stairs at work   What keeps you from being more active?  Pain       Social History:      6/6/2019   Are you smoking? No   Are you drinking alcohol? No       Medications:  Current Outpatient Medications   Medication     acetaminophen (TYLENOL) 325 MG tablet     acetaminophen-codeine (TYLENOL #3) 300-30 MG tablet     cyclobenzaprine (FLEXERIL) 10 MG tablet     DULoxetine (CYMBALTA) 60 MG capsule     FLUoxetine (PROZAC) 40 MG capsule     FLUTICASONE PROPIONATE, NASAL, NA     gabapentin (NEURONTIN) 400 MG capsule     HYDROcodone-acetaminophen (NORCO) 5-325 MG tablet     nortriptyline (PAMELOR) 25 MG capsule     omeprazole (PRILOSEC) 20 MG CR capsule     methylPREDNISolone (MEDROL DOSEPAK) 4 MG tablet therapy pack     No current facility-administered medications for this visit.          6/6/2019   Do you avoid NSAIDs such as (Ibuprofen, Aleve, Naproxen, Advil)?   Yes       ROS:  GI:      6/6/2019   Vomiting: No   Diarrhea: No   Constipation: No   Swallowing trouble: No   Abdominal pain: No   Heartburn: No   Rash in skin folds: No   Depression: No   Stress urinary incontinence No     Skin:   BAR RBS ROS - SKIN 6/6/2019   Rash in skin folds: No  "    Psych:      6/6/2019   Depression: No   Anxiety: No     Female Only:   BAR RBS ROS - FEMALE ONLY 6/6/2019   Female only: Post-menopausal       LABS/IMAGING/MEDICAL RECORDS REVIEW:     PHYSICAL EXAMINATION:  /62 (BP Location: Left arm, Patient Position: Sitting, Cuff Size: Adult Regular)   Pulse 72   Ht 1.625 m (5' 3.98\")   Wt 82.4 kg (181 lb 9.6 oz)   SpO2 98%   BMI 31.19 kg/m     General: No apparent distress  Neuro: A & O x 3  Head: Atraumatic, normocephalic  Skin: warm and dry, no rashes on exposed skin  Respiratory: respirations unlabored  Abdomen: soft, non-tender  Extremities: No LE swelling        ASSESSMENT AND PLAN:      1. 9 months status laparoscopic gastric sleeve  2. Morbid Obesity current BMI: Body mass index is 31.19 kg/m .  3. Post surgical malabsorption:   Labs ordered per protocol.   Follow food plan per dietitian recommendations.   Continue taking recommended post-op vitamins.  4. Return to clinic in 1 year or sooner if issues (pt would like to avoid 1yr apt due to travel, labs for 1 year ordered).    For back pain  Talk to spine team about physical therapy to increase core strength   Would not recommend NSAIDS     GERD  Can wean off omeprazole if able       Sincerely,    Gissel David NP    I spent a total of 15 minutes face to face with Dara during today's office visit. Over 50% of this time was spent counseling the patient and/or coordinating care.  Answers for HPI/ROS submitted by the patient on 6/6/2019   PHQ9 TOTAL SCORE: 15 ** directly related to acute pain which is being managed     "

## 2019-06-06 NOTE — PROGRESS NOTES
Nutrition Reassessment  Reason For Visit:  Dara Anderson is a 58 year old female presenting today for nutrition follow-up, 9 months s/p SG with Dr Puente on 9/11/18.  Patient referred by Dr Puente(9/20/18) and Gissel David.    Anthropometrics:  Initial Consult Weight: 255.3 lbs  Day of Surgery Weight: 212.5 lbs  Current Weight: 181.6 lbs  Weight loss: -73.7 lbs from initial consult; -30.9 lbs from day of surgery    Current Vitamins/Minerals: MVI/minerals BID, calcium, Vitamin B12, Vitamin D    Nutrition History:  Recent food recall:  Breakfast: wheat toast with PB  occ yogurt or kind bar  Lunch: sandwich or burger(1/2 richar) without bun  Dinner: protein  Snacks: kind bar, yogurt or PB  Beverages: water(16x 3) occ ice tea(diet snapple)     1) Follow bariatric regular diet. met   2) Consume 60 grams of protein/day. continues   3) Sip on 48-64 oz of fluids/day- between meals only. met   4) Eat slowly (>20 min/meal), chewing foods well (to applesauce-like consistency). met   5) Limit portions to 1/2 cup/meal. Met, 1/2- 3/4 cup   6) Take the following supplements: met     Multivitamin/minerals: adult dose 2 times daily    Iron: 45-60 mg elemental (18-36 mg if low risk) - may partly or fully be covered in multivitamin     Calcium Citrate containing vitamin D: 500 mg 3 times daily or 600 mg 2 times daily    Vitamin B12: sublingual form of at least 500 mcg daily or injection of 1000 mcg monthly     B-50 Complex once daily  7) Continue walking at least 1/2 mile per day. Decreased this past week due to pain 1/2 mile twice per day, trying to go to the gym     Nutrition Prescription:  Grams Protein: 60 (minimum)   Amount of Fluid: 48-64 oz    Nutrition Diagnosis  Previous: Food and nutrition-related knowledge deficit r/t lack of prior exposure to diet instruction beyond 3 months s/p SG as evidenced by Pt seeking further guidance from RD on diet instruction beyond 3 months s/p SG. - resolved    Current: Obesity r/t long  history of self-monitoring deficit and excessive energy intake aeb BMI >30.     Intervention  Materials/Education provided, reviewed bariatric regular diet, protein intake, fluid intake, eating pace, chewing foods well, portion control, sugar/fat intake, recommended vitamin/mineral supplements. Patient noted decreased activity due to back pain but plans to get back to regular exercise after her mother moves out next week.  She may have nuts/seed or peanut butter 1-2 times per day, encouraged watching portions of foods that are high in calories from small portions to help with continued weight loss and maintenance.    Patient Understanding: good  Expected Compliance: good    Goals:  1) Follow bariatric regular diet.   2) Consume 60 grams of protein/day.  3) Sip on 48-64 oz of fluids/day- between meals only.  4) Eat slowly (>20 min/meal), chewing foods well (to applesauce-like consistency).  5) Limit portions to 1/2-1 cup/meal.  6) Take the following supplements:    Multivitamin/minerals: adult dose 2 times daily    Iron: 45-60 mg elemental (18-36 mg if low risk) - may partly or fully be covered in multivitamin     Calcium Citrate containing vitamin D: 500 mg 3 times daily or 600 mg 2 times daily    Vitamin B12: sublingual form of at least 500 mcg daily or injection of 1000 mcg monthly     B-50 Complex once daily  7) Continue walking at least 1/2 mile per day.         Follow-Up: prn    Time spent with patient: 15 minutes.  Vicki Crawford, RD, LD

## 2019-06-06 NOTE — PATIENT INSTRUCTIONS
It was a pleasure meeting with you today.     Thank you for allowing us the privilege of caring for you. We hope we provided you with the excellent service you deserve.     Please let us know if there is anything else we can do for you so that we can be sure you are leaving completely satisfied with your care experience.      You saw TAMMI Wilson CNP today.     Instructions per today's visit:     Okay to try to wean off of omeprazole- every other day, then every couple of days, then stop. Okay to continue if you get more heart burn  Try to avoid NSAIDS  Talk to pain doctor about PT for core strengthening   Fasting labs when able   Follow up in 1 year or sooner if needed     To schedule appointments with our team, please call [phone] option #1    Please call during clinic hours Monday through Friday 8:00a - 4:00p if you have questions or you can contact us via [email or web address] at anytime.      Nurses: [phone]  Fax: [phone]  Surgery Scheduler: [phone]    Please call the hospital at [phone] to speak with our on call MDs if you have urgent needs after hours, during weekends, or holidays.    **Please note if you need to go to the Emergency Room for any reason in the first 90 days after surgery it is important to go to the [facility] ER on the [place] on [address] off of the [place] [address] off [address]    Lab results will be communicated through My Chart or letter (if My Chart not used). Please call the clinic if you have not received communication after 1 week or if you have any questions.?      Main follow-up parameters for all bariatric patients     Patients who have undergone Bariatric surgery:    1. Follow-up interval during the first year: ~1 week, 1 month, 3 month, 6 month,12 months.  Every 6 months until 5 years; and then annually thereafter.  The goal of follow-up sessions is to ensure that food intake behavior (choice of food and eating speed) and exercise/activity level are set  appropriately.    2. Yearly lab tests ordered by our clinic.      3. The bariatric team should be aware and potentially evaluate all adverse gastrointestinal symptoms (dysphagia, abdominal pain, nausea, vomiting, diarrhea, heartburn, reflux, etc), which can be a sign of complication.  The bariatric surgeons perform general surgery procedures in addition to bariatric surgery (laparoscopic cholecystectomy, etc.)    4. Inability to tolerate textured food (chicken, steak, fish, eggs, beans) is NOT normal and may need to be evaluated by endoscopy performed by the bariatric surgeon.

## 2019-06-07 ASSESSMENT — PATIENT HEALTH QUESTIONNAIRE - PHQ9: SUM OF ALL RESPONSES TO PHQ QUESTIONS 1-9: 15

## 2019-06-27 ENCOUNTER — TRANSFERRED RECORDS (OUTPATIENT)
Dept: HEALTH INFORMATION MANAGEMENT | Facility: CLINIC | Age: 59
End: 2019-06-27

## 2019-11-03 ENCOUNTER — HEALTH MAINTENANCE LETTER (OUTPATIENT)
Age: 59
End: 2019-11-03

## 2020-02-10 ENCOUNTER — HEALTH MAINTENANCE LETTER (OUTPATIENT)
Age: 60
End: 2020-02-10

## 2020-11-16 ENCOUNTER — HEALTH MAINTENANCE LETTER (OUTPATIENT)
Age: 60
End: 2020-11-16

## 2021-04-04 ENCOUNTER — HEALTH MAINTENANCE LETTER (OUTPATIENT)
Age: 61
End: 2021-04-04

## 2021-07-24 ENCOUNTER — HEALTH MAINTENANCE LETTER (OUTPATIENT)
Age: 61
End: 2021-07-24

## 2021-09-18 ENCOUNTER — HEALTH MAINTENANCE LETTER (OUTPATIENT)
Age: 61
End: 2021-09-18

## 2022-04-30 ENCOUNTER — HEALTH MAINTENANCE LETTER (OUTPATIENT)
Age: 62
End: 2022-04-30

## 2022-08-26 NOTE — PROGRESS NOTES
The patient completed the following battery of assessments during this pre-operative bariatric surgery psychological evaluation: World Health Organization Disability Assessment Schedule 2.0 12-item (WHODAS), Patient Health Questionnaire-9 (PHQ-9), Generalized Anxiety Disorder-7 screener (DANTE-7), CAGE Questionnaire Adapted to Include Drugs (CAGE-AID), Suicide Behavior Questionnaire-Revised (SBQ-R), and the [place] Multiphasic Personality Inventory-2 (MMPI-2). Results will be included in the full report to follow.     Rosey Gould, PhD,   Clinical Health Psychologist   [place] Pregnancy And Lactation Text: There is insufficient data to evaluate drug-associated risk for major birth defects, miscarriage, or other adverse maternal or fetal outcomes.  There is a pregnancy registry that monitors pregnancy outcomes in pregnant persons exposed to the medication during pregnancy.  It is unknown if this medication is excreted in breast milk.  Do not breastfeed during treatment and for about 4 weeks after the last dose.

## 2022-11-20 ENCOUNTER — HEALTH MAINTENANCE LETTER (OUTPATIENT)
Age: 62
End: 2022-11-20

## 2023-06-01 ENCOUNTER — HEALTH MAINTENANCE LETTER (OUTPATIENT)
Age: 63
End: 2023-06-01

## 2023-09-10 ENCOUNTER — HEALTH MAINTENANCE LETTER (OUTPATIENT)
Age: 63
End: 2023-09-10

## (undated) DEVICE — Device

## (undated) DEVICE — ESU GROUND PAD ADULT W/CORD E7507

## (undated) DEVICE — ENDO TROCAR FIRST ENTRY KII FIOS Z-THRD 05X100MM CTF03

## (undated) DEVICE — NDL INSUFFLATION 13GA 150MM C2202

## (undated) DEVICE — ENDO TROCAR SLEEVE KII Z-THREADED 05X100MM CTS02

## (undated) DEVICE — LIGHT HANDLE X1 31140133

## (undated) DEVICE — STPL POWERED ECHELON LONG 60MM PLEE60A

## (undated) DEVICE — PREP CHLORAPREP 26ML TINTED ORANGE  260815

## (undated) DEVICE — CLIP APPLIER ENDO 5MM M/L LIGAMAX EL5ML

## (undated) DEVICE — STPL RELOAD REG TISSUE ECHELON 60 X 3.6MM BLUE GST60B

## (undated) DEVICE — GLOVE PROTEXIS POWDER FREE SMT 7.5  2D72PT75X

## (undated) DEVICE — COVER CAMERA IN-LIGHT DISP LT-C02

## (undated) DEVICE — ENDO TROCAR OPTICAL ACCESS KII Z-THRD 15X100MM C0R37

## (undated) DEVICE — ESU PENCIL W/COATED BLADE E2450H

## (undated) DEVICE — DRSG PRIMAPORE 02X3" 7133

## (undated) DEVICE — ESU LIGASURE MARYLAND VESSEL LAP 44CM XLONG LF1944

## (undated) DEVICE — ENDO POUCH UNIVERSAL RETRIEVAL SYSTEM INZII 12/15MM CD004

## (undated) DEVICE — LINEN TOWEL PACK X30 5481

## (undated) DEVICE — STPL SKIN 35W ROTATING HEAD PRW35

## (undated) DEVICE — LINEN TOWEL PACK X6 WHITE 5487

## (undated) RX ORDER — PROPOFOL 10 MG/ML
INJECTION, EMULSION INTRAVENOUS
Status: DISPENSED
Start: 2018-09-11

## (undated) RX ORDER — ONDANSETRON 2 MG/ML
INJECTION INTRAMUSCULAR; INTRAVENOUS
Status: DISPENSED
Start: 2018-09-11

## (undated) RX ORDER — LIDOCAINE HYDROCHLORIDE 20 MG/ML
INJECTION, SOLUTION EPIDURAL; INFILTRATION; INTRACAUDAL; PERINEURAL
Status: DISPENSED
Start: 2018-09-11

## (undated) RX ORDER — FENTANYL CITRATE 50 UG/ML
INJECTION, SOLUTION INTRAMUSCULAR; INTRAVENOUS
Status: DISPENSED
Start: 2018-09-11

## (undated) RX ORDER — BUPIVACAINE HYDROCHLORIDE 5 MG/ML
INJECTION, SOLUTION EPIDURAL; INTRACAUDAL
Status: DISPENSED
Start: 2018-09-11

## (undated) RX ORDER — ASPIRIN 325 MG
TABLET ORAL
Status: DISPENSED
Start: 2018-09-11

## (undated) RX ORDER — EPHEDRINE SULFATE 50 MG/ML
INJECTION, SOLUTION INTRAMUSCULAR; INTRAVENOUS; SUBCUTANEOUS
Status: DISPENSED
Start: 2018-09-11

## (undated) RX ORDER — GLYCOPYRROLATE 0.2 MG/ML
INJECTION, SOLUTION INTRAMUSCULAR; INTRAVENOUS
Status: DISPENSED
Start: 2018-09-11

## (undated) RX ORDER — CLINDAMYCIN PHOSPHATE 900 MG/50ML
INJECTION, SOLUTION INTRAVENOUS
Status: DISPENSED
Start: 2018-09-11

## (undated) RX ORDER — SODIUM CHLORIDE, SODIUM LACTATE, POTASSIUM CHLORIDE, CALCIUM CHLORIDE 600; 310; 30; 20 MG/100ML; MG/100ML; MG/100ML; MG/100ML
INJECTION, SOLUTION INTRAVENOUS
Status: DISPENSED
Start: 2018-09-11

## (undated) RX ORDER — PHENYLEPHRINE HCL IN 0.9% NACL 1 MG/10 ML
SYRINGE (ML) INTRAVENOUS
Status: DISPENSED
Start: 2018-09-11

## (undated) RX ORDER — MORPHINE SULFATE 2 MG/ML
INJECTION, SOLUTION INTRAMUSCULAR; INTRAVENOUS
Status: DISPENSED
Start: 2018-09-11